# Patient Record
Sex: FEMALE | Race: WHITE | NOT HISPANIC OR LATINO | Employment: FULL TIME | URBAN - METROPOLITAN AREA
[De-identification: names, ages, dates, MRNs, and addresses within clinical notes are randomized per-mention and may not be internally consistent; named-entity substitution may affect disease eponyms.]

---

## 2024-04-02 ENCOUNTER — OFFICE VISIT (OUTPATIENT)
Dept: FAMILY MEDICINE CLINIC | Facility: CLINIC | Age: 29
End: 2024-04-02
Payer: COMMERCIAL

## 2024-04-02 VITALS
TEMPERATURE: 97.7 F | HEIGHT: 64 IN | DIASTOLIC BLOOD PRESSURE: 70 MMHG | OXYGEN SATURATION: 97 % | SYSTOLIC BLOOD PRESSURE: 122 MMHG | BODY MASS INDEX: 34.15 KG/M2 | HEART RATE: 99 BPM | WEIGHT: 200 LBS

## 2024-04-02 DIAGNOSIS — Z13.29 SCREENING FOR ENDOCRINE, METABOLIC AND IMMUNITY DISORDER: ICD-10-CM

## 2024-04-02 DIAGNOSIS — Z13.220 SCREENING FOR LIPID DISORDERS: ICD-10-CM

## 2024-04-02 DIAGNOSIS — Z00.00 WELL ADULT EXAM: Primary | ICD-10-CM

## 2024-04-02 DIAGNOSIS — Z13.0 SCREENING FOR ENDOCRINE, METABOLIC AND IMMUNITY DISORDER: ICD-10-CM

## 2024-04-02 DIAGNOSIS — Z13.228 SCREENING FOR ENDOCRINE, METABOLIC AND IMMUNITY DISORDER: ICD-10-CM

## 2024-04-02 DIAGNOSIS — F31.81 BIPOLAR 2 DISORDER, MAJOR DEPRESSIVE EPISODE (HCC): Chronic | ICD-10-CM

## 2024-04-02 DIAGNOSIS — Z13.1 SCREENING FOR DIABETES MELLITUS (DM): ICD-10-CM

## 2024-04-02 DIAGNOSIS — Z13.0 SCREENING FOR DEFICIENCY ANEMIA: ICD-10-CM

## 2024-04-02 DIAGNOSIS — Z11.59 NEED FOR HEPATITIS C SCREENING TEST: ICD-10-CM

## 2024-04-02 PROBLEM — I45.10 INCOMPLETE RBBB: Status: ACTIVE | Noted: 2021-06-29

## 2024-04-02 PROBLEM — M95.8 OSTEOCHONDRAL DEFECT OF FEMORAL CONDYLE: Status: ACTIVE | Noted: 2021-10-27

## 2024-04-02 PROBLEM — Z72.0 TOBACCO ABUSE: Status: ACTIVE | Noted: 2017-12-07

## 2024-04-02 PROBLEM — S83.004A DISLOCATION OF RIGHT PATELLA: Status: ACTIVE | Noted: 2021-11-09

## 2024-04-02 PROBLEM — F19.91 HISTORY OF DRUG USE: Status: ACTIVE | Noted: 2021-01-29

## 2024-04-02 PROBLEM — J30.2 SEASONAL ALLERGIES: Status: ACTIVE | Noted: 2021-05-17

## 2024-04-02 PROBLEM — R87.610 ASCUS OF CERVIX WITH NEGATIVE HIGH RISK HPV: Status: ACTIVE | Noted: 2018-08-14

## 2024-04-02 PROBLEM — F41.1 GENERALIZED ANXIETY DISORDER: Chronic | Status: ACTIVE | Noted: 2024-04-02

## 2024-04-02 PROCEDURE — 99385 PREV VISIT NEW AGE 18-39: CPT | Performed by: FAMILY MEDICINE

## 2024-04-02 RX ORDER — FLUOXETINE HYDROCHLORIDE 20 MG/1
20 CAPSULE ORAL DAILY
COMMUNITY
Start: 2024-03-15

## 2024-04-02 RX ORDER — SUMATRIPTAN 100 MG/1
100 TABLET, FILM COATED ORAL
COMMUNITY
Start: 2024-03-15

## 2024-04-02 RX ORDER — OLANZAPINE 5 MG/1
7.5 TABLET ORAL
COMMUNITY
Start: 2024-03-15

## 2024-04-02 NOTE — PATIENT INSTRUCTIONS
Schedule ob appointment   Fasting blood work at [facility]     Wellness Visit for Adults   AMBULATORY CARE:   A wellness visit  is when you see your healthcare provider to get screened for health problems. Your healthcare provider will also give you advice on how to stay healthy. Write down your questions so you remember to ask them. Ask your healthcare provider how often you should have a wellness visit.  What happens at a wellness visit:  Your healthcare provider will ask about your health, and your family history of health problems. This includes high blood pressure, heart disease, and cancer. He or she will ask if you have symptoms that concern you, if you smoke, and about your mood. You may also be asked about your intake of medicines, supplements, food, and alcohol. Any of the following may be done:  Your weight  will be checked. Your height may also be checked so your body mass index (BMI) can be calculated. Your BMI shows if you are at a healthy weight.    Your blood pressure  and heart rate will be checked. Your temperature may also be checked.    Blood and urine tests  may be done. Blood tests may be done to check your cholesterol levels. Abnormal cholesterol levels increase your risk for heart disease and stroke. You may also need a blood or urine test to check for diabetes if you are at increased risk. Urine tests may be done to look for signs of an infection or kidney disease.    A physical exam  includes checking your heartbeat and lungs with a stethoscope. Your healthcare provider may also check your skin to look for sun damage.    Screening tests  may be recommended. A screening test is done to check for diseases that may not cause symptoms. The screening tests you may need depend on your age, gender, family history, and lifestyle habits. For example, colorectal screening may be recommended if you are 50 years old or older.    Screening tests you need if you are a woman:   A Pap smear  is used to screen  for cervical cancer. Pap smears are usually done every 3 to 5 years depending on your age. You may need them more often if you have had abnormal Pap smear test results in the past. Ask your healthcare provider how often you should have a Pap smear.    A mammogram  is an x-ray of your breasts to screen for breast cancer. Experts recommend mammograms every 2 years starting at age 50 years. You may need a mammogram at age 49 years or younger if you have an increased risk for breast cancer. Talk to your healthcare provider about when you should start having mammograms and how often you need them.    Vaccines you may need:   Get an influenza vaccine  every year. The influenza vaccine protects you from the flu. Several types of viruses cause the flu. The viruses change over time, so new vaccines are made each year.    Get a tetanus-diphtheria (Td) booster vaccine  every 10 years. This vaccine protects you against tetanus and diphtheria. Tetanus is a severe infection that may cause painful muscle spasms and lockjaw. Diphtheria is a severe bacterial infection that causes a thick covering in the back of your mouth and throat.    Get a human papillomavirus (HPV) vaccine  if you are female and aged 19 to 26 or male 19 to 21 and never received it. This vaccine protects you from HPV infection. HPV is the most common infection spread by sexual contact. HPV may also cause vaginal, penile, and anal cancers.    Get a pneumococcal vaccine  if you are aged 65 years or older. The pneumococcal vaccine is an injection given to protect you from pneumococcal disease. Pneumococcal disease is an infection caused by pneumococcal bacteria. The infection may cause pneumonia, meningitis, or an ear infection.    Get a shingles vaccine  if you are 60 or older, even if you have had shingles before. The shingles vaccine is an injection to protect you from the varicella-zoster virus. This is the same virus that causes chickenpox. Shingles is a  painful rash that develops in people who had chickenpox or have been exposed to the virus.    How to eat healthy:  My Plate is a model for planning healthy meals. It shows the types and amounts of foods that should go on your plate. Fruits and vegetables make up about half of your plate, and grains and protein make up the other half. A serving of dairy is included on the side of your plate. The amount of calories and serving sizes you need depends on your age, gender, weight, and height. Examples of healthy foods are listed below:  Eat a variety of vegetables  such as dark green, red, and orange vegetables. You can also include canned vegetables low in sodium (salt) and frozen vegetables without added butter or sauces.    Eat a variety of fresh fruits , canned fruit in 100% juice, frozen fruit, and dried fruit.    Include whole grains.  At least half of the grains you eat should be whole grains. Examples include whole-wheat bread, wheat pasta, brown rice, and whole-grain cereals such as oatmeal.    Eat a variety of protein foods such as seafood (fish and shellfish), lean meat, and poultry without skin (turkey and chicken). Examples of lean meats include pork leg, shoulder, or tenderloin, and beef round, sirloin, tenderloin, and extra lean ground beef. Other protein foods include eggs and egg substitutes, beans, peas, soy products, nuts, and seeds.    Choose low-fat dairy products such as skim or 1% milk or low-fat yogurt, cheese, and cottage cheese.    Limit unhealthy fats  such as butter, hard margarine, and shortening.       Exercise:  Exercise at least 30 minutes per day on most days of the week. Some examples of exercise include walking, biking, dancing, and swimming. You can also fit in more physical activity by taking the stairs instead of the elevator or parking farther away from stores. Include muscle strengthening activities 2 days each week. Regular exercise provides many health benefits. It helps you  manage your weight, and decreases your risk for type 2 diabetes, heart disease, stroke, and high blood pressure. Exercise can also help improve your mood. Ask your healthcare provider about the best exercise plan for you.       General health and safety guidelines:   Do not smoke.  Nicotine and other chemicals in cigarettes and cigars can cause lung damage. Ask your healthcare provider for information if you currently smoke and need help to quit. E-cigarettes or smokeless tobacco still contain nicotine. Talk to your healthcare provider before you use these products.    Limit alcohol.  A drink of alcohol is 12 ounces of beer, 5 ounces of wine, or 1½ ounces of liquor.    Lose weight, if needed.  Being overweight increases your risk of certain health conditions. These include heart disease, high blood pressure, type 2 diabetes, and certain types of cancer.    Protect your skin.  Do not sunbathe or use tanning beds. Use sunscreen with a SPF 15 or higher. Apply sunscreen at least 15 minutes before you go outside. Reapply sunscreen every 2 hours. Wear protective clothing, hats, and sunglasses when you are outside.    Drive safely.  Always wear your seatbelt. Make sure everyone in your car wears a seatbelt. A seatbelt can save your life if you are in an accident. Do not use your cell phone when you are driving. This could distract you and cause an accident. Pull over if you need to make a call or send a text message.    Practice safe sex.  Use latex condoms if are sexually active and have more than one partner. Your healthcare provider may recommend screening tests for sexually transmitted infections (STIs).    Wear helmets, lifejackets, and protective gear.  Always wear a helmet when you ride a bike or motorcycle, go skiing, or play sports that could cause a head injury. Wear protective equipment when you play sports. Wear a lifejacket when you are on a boat or doing water sports.    © Copyright [employer] 2023 Information  is for End User's use only and may not be sold, redistributed or otherwise used for commercial purposes.  The above information is an  only. It is not intended as medical advice for individual conditions or treatments. Talk to your doctor, nurse or pharmacist before following any medical regimen to see if it is safe and effective for you.

## 2024-04-03 ENCOUNTER — TELEPHONE (OUTPATIENT)
Dept: ADMINISTRATIVE | Facility: OTHER | Age: 29
End: 2024-04-03

## 2024-04-03 NOTE — TELEPHONE ENCOUNTER
----- Message from Agnes Muro MA sent at 4/2/2024  3:42 PM EDT -----  Regarding: Pap  Pap results in patient chart from 05/19/2023, please update record. Thank you

## 2024-04-03 NOTE — TELEPHONE ENCOUNTER
Upon review of the In Basket request we were able to locate, review, and update the patient chart as requested for Pap Smear (HPV) aka Cervical Cancer Screening.    Any additional questions or concerns should be emailed to the Practice Liaisons via the appropriate education email address, please do not reply via In Basket.    Thank you  Caryl Eller

## 2024-04-06 PROBLEM — Z00.00 WELL ADULT EXAM: Status: ACTIVE | Noted: 2024-04-06

## 2024-04-07 NOTE — PROGRESS NOTES
ADULT ANNUAL PHYSICAL  [facility]    NAME: Kimberlyn Wills  AGE: 28 y.o. SEX: female  : 1995     DATE: 2024     Assessment and Plan:     1. Well adult exam  Assessment & Plan:  Pt will get her blood work done with her OB blood work.       2. Bipolar 2 disorder, major depressive episode (HCC)  Assessment & Plan:  Continue with psychiatry fluoxetine and olanzapine- stable on medication.       3. Screening for deficiency anemia  -     CBC and differential    4. Screening for diabetes mellitus (DM)  -     Comprehensive metabolic panel    5. Screening for endocrine, metabolic and immunity disorder  -     Comprehensive metabolic panel  -     TSH, 3rd generation with Free T4 reflex    6. Screening for lipid disorders  -     Lipid Panel with Direct LDL reflex    7. Need for hepatitis C screening test  -     Hepatitis C antibody; Future  -     Hepatitis C antibody        Immunizations and preventive care screenings were discussed with patient today. Appropriate education was printed on patient's after visit summary.    Counseling:  Dental Health: discussed importance of regular tooth brushing, flossing, and dental visits.  Exercise: the importance of regular exercise/physical activity was discussed. Recommend exercise 3-5 times per week for at least 30 minutes.          No follow-ups on file.     Chief Complaint:     Chief Complaint   Patient presents with    Establish Care     NP/ PCP was tejal alberto   She found out she pregnant    Pend labs       History of Present Illness:     Pt is here for her initial visit in our office. She just found out yesterday- she had a positive pregnancy test. She is generally healthy. She has had another pregnancy and was able to quit smoking during the pregnancy. She will make an appointment with her OB. She states she feels pretty good.           Review of Systems:     Review of Systems   Constitutional: Negative.  Negative  for fatigue and fever.   HENT: Negative.     Eyes: Negative.    Respiratory: Negative.  Negative for cough.    Cardiovascular: Negative.    Gastrointestinal: Negative.    Endocrine: Negative.    Genitourinary: Negative.    Musculoskeletal: Negative.    Skin: Negative.    Allergic/Immunologic: Negative.    Neurological: Negative.    Psychiatric/Behavioral: Negative.        Past Medical History:     Past Medical History:   Diagnosis Date    Asthma     Psychiatric disorder     depression and anxiety      Past Surgical History:     Past Surgical History:   Procedure Laterality Date    WISDOM TOOTH EXTRACTION        Social History:     Social History     Socioeconomic History    Marital status: Single     Spouse name: None    Number of children: None    Years of education: None    Highest education level: None   Occupational History    None   Tobacco Use    Smoking status: Every Day    Smokeless tobacco: None   Substance and Sexual Activity    Alcohol use: Yes     Comment: occassionally    Drug use: No    Sexual activity: None   Other Topics Concern    None   Social History Narrative    None     Social Determinants of Health     Financial Resource Strain: Not on file   Food Insecurity: Not on file   Transportation Needs: Not on file   Physical Activity: Not on file   Stress: Not on file   Social Connections: Not on file   Intimate Partner Violence: Not on file   Housing Stability: Not on file      Family History:     History reviewed. No pertinent family history.   Current Medications:     Current Outpatient Medications   Medication Sig Dispense Refill    FLUoxetine (PROzac) 20 mg capsule Take 20 mg by mouth daily      OLANZapine (ZyPREXA) 5 mg tablet Take 7.5 mg by mouth      SUMAtriptan (IMITREX) 100 mg tablet Take 100 mg by mouth       No current facility-administered medications for this visit.      Allergies:     No Known Allergies   Physical Exam:     /70   Pulse 99   Temp 97.7 °F (36.5 °C) (Tympanic)   Ht  "5' 4\" (1.626 m)   Wt 90.7 kg (200 lb)   SpO2 97%   BMI 34.33 kg/m²     Physical Exam  Vitals and nursing note reviewed.   Constitutional:       Appearance: She is well-developed.   HENT:      Head: Normocephalic and atraumatic.      Right Ear: External ear normal.      Left Ear: External ear normal.      Nose: Nose normal.   Eyes:      Conjunctiva/sclera: Conjunctivae normal.      Pupils: Pupils are equal, round, and reactive to light.   Cardiovascular:      Rate and Rhythm: Normal rate and regular rhythm.      Heart sounds: Normal heart sounds.   Pulmonary:      Effort: Pulmonary effort is normal.      Breath sounds: Normal breath sounds.   Abdominal:      General: Bowel sounds are normal.      Palpations: Abdomen is soft.   Musculoskeletal:         General: Normal range of motion.      Cervical back: Normal range of motion and neck supple.   Skin:     General: Skin is warm and dry.   Neurological:      Mental Status: She is alert and oriented to person, place, and time.   Psychiatric:         Behavior: Behavior normal.         Thought Content: Thought content normal.         Judgment: Judgment normal.          Millie Jacob DO  [facility]  "

## 2024-04-15 NOTE — PROGRESS NOTES
"S: 28 y.o.  who presents for viability scan with LMP of 24 (exact with regular periods). She is 8 weeks and 3 days by her LMP. She reports significant depression; increased medications of Zyprexa and Prozac. She plans to continue medications and work with counselors. She denies cramping or vaginal bleeding. This is a surprise but welcomed pregnancy. Her previous pregnancies complicated by partial placenta abruption.     Past Medical History:   Diagnosis Date    Asthma     Psychiatric disorder     depression and anxiety       OB History    Para Term  AB Living   2 1 1     1   SAB IAB Ectopic Multiple Live Births           1      # Outcome Date GA Lbr Felipe/2nd Weight Sex Delivery Anes PTL Lv   2 Current            1 Term 18 38w4d / 00:09 2356 g (5 lb 3.1 oz) F Vag-Spont EPI, Local  KIT      Complications: Abruptio Placenta        O:  Vitals:    24 1022   BP: 124/68   BP Location: Left arm   Patient Position: Sitting   Cuff Size: Large   Weight: 90.3 kg (199 lb)   Height: 5' 4\" (1.626 m)     TVUS indicated viable IUP, measuring 14.1 mm, correlating to CRL of 7w5d. CRL is concurrent with LMP at 8w3d. WARREN is 24 based on LMP. FHR: 158    A/P:  1. Viable pregnancy on TVUS  2. MFM referral placed.  3. RTC in 2 week for nurse intake visit    Problem List Items Addressed This Visit    None  Visit Diagnoses       Amenorrhea    -  Primary    Relevant Orders    [facility] US OB < 14 weeks single or first gestation level 1    Early stage of pregnancy        Relevant Orders    Ambulatory Referral to Maternal Fetal Medicine           "

## 2024-04-18 ENCOUNTER — ULTRASOUND (OUTPATIENT)
Dept: OBGYN CLINIC | Facility: CLINIC | Age: 29
End: 2024-04-18

## 2024-04-18 VITALS
WEIGHT: 199 LBS | HEIGHT: 64 IN | DIASTOLIC BLOOD PRESSURE: 68 MMHG | SYSTOLIC BLOOD PRESSURE: 124 MMHG | BODY MASS INDEX: 33.97 KG/M2

## 2024-04-18 DIAGNOSIS — Z34.90 EARLY STAGE OF PREGNANCY: ICD-10-CM

## 2024-04-18 DIAGNOSIS — N91.2 AMENORRHEA: Primary | ICD-10-CM

## 2024-04-18 NOTE — PATIENT INSTRUCTIONS
At Checkout, make an appointment for OB Nurse Intake and Education in 2 weeks.  Please schedule future prenatal visits at checkout or by calling the [place] office at [phone]. Next appointment with a provider is in 4 weeks and will be a physical exam.   Call Maternal fetal medicine to establish care.    [email or web address]

## 2024-04-25 ENCOUNTER — INITIAL PRENATAL (OUTPATIENT)
Dept: OBGYN CLINIC | Facility: CLINIC | Age: 29
End: 2024-04-25

## 2024-04-25 VITALS
DIASTOLIC BLOOD PRESSURE: 64 MMHG | OXYGEN SATURATION: 98 % | RESPIRATION RATE: 16 BRPM | WEIGHT: 199.8 LBS | BODY MASS INDEX: 34.11 KG/M2 | HEIGHT: 64 IN | SYSTOLIC BLOOD PRESSURE: 126 MMHG | HEART RATE: 84 BPM

## 2024-04-25 DIAGNOSIS — Z34.81 ENCOUNTER FOR SUPERVISION OF OTHER NORMAL PREGNANCY, FIRST TRIMESTER: Primary | ICD-10-CM

## 2024-04-25 DIAGNOSIS — Z31.430 ENCOUNTER OF FEMALE FOR TESTING FOR GENETIC DISEASE CARRIER STATUS FOR PROCREATIVE MANAGEMENT: ICD-10-CM

## 2024-04-25 PROCEDURE — OBC

## 2024-04-25 NOTE — PROGRESS NOTES
Details that I feel the provider should be aware of:   -h/o  2018 complicated by partial placenta abruption  -EPDS 13. H/o PPD. Medical history significant for depression, recently had medications increased by prescribing provider. Reports established and follows closely, provider had now recommended/planning for monthly follow ups during pregnancy.  -current tobacco and marijuana use. Patient reports weaning down to 3 cig/day. Has medical marijuana card had stopped use then noticed increased symptoms so has been using sporadically as needed. Patient reports reviewing with provider at d/v us and states will review with MFM at upcoming scheduled appt  24.  -last pap 23      OB INTAKE INTERVIEW  Patient is 28 y.o.y.o. who presents for OB intake at 9w3d  She is present alone during this encounter  The father of her baby (Frederic) is fiance, supportive and involved in the pregnancy and is 30 years old.      Last Menstrual Period: 24  Ultrasound: Measured 7 weeks 5 days on 24  Estimated Date of Delivery: 24 by lmp confirmed by 7 week US    Signs/Symptoms of Pregnancy  Current pregnancy symptoms: +nausea - taking vitamin b6 and managing. Reviewed recommendations. Denies vomiting.  Constipation YES - reviewed  Headaches YES - h/o prior, imitrex prescription prn. Has not taken in pregnancy. 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 Tylenol is effective. Reviewed recommendations and when to call office.   Cramping/spotting YES - intermittent cramping, quickly resolving when occurs. Denies any VB. Aware if persistent, worsening, or other symptoms to call office.  PICA cravings no    Diabetes-  BMI 34.16  If patient has 1 or more, please order early 1 hour GTT  History of GDM no  BMI >35 no  History of PCOS or current metformin use no  History of LGA/macrosomic infant (4000g/9lbs) no    If patient has 2 or more, please order early 1 hour GTT  BMI>30 YES  AMA no  First  degree relative with type 2 diabetes no  History of chronic HTN, hyperlipidemia, elevated A1C no  High risk race (, , ,  or ) no    Hypertension- if you answer yes to any of the following, please order baseline preeclampsia labs (cbc, comprehensive metabolic panel, urine protein creatinine ratio, uric acid)  History of of chronic HTN no  History of gestational HTN no  History of preeclampsia, eclampsia, or HELLP syndrome no  History of diabetes no  History of lupus, autoimmune disease, kidney disease no    Thyroid- if yes order TSH with reflex T4  History of thyroid disease no    Bleeding Disorder or Hx of DVT-patient or first degree relative with history of. Order the following if not done previously.   (Factor V, antithrombin III, prothrombin gene mutation, protein C and S Ag, lupus anticoagulant, anticardiolipin, beta-2 glycoprotein)   no    OB/GYN-  History of abnormal pap smear YES     - pt recalls 1 prior abnormal pap, unsure of details, re-pap recommended and f/u pap negative/normal  Date of last pap smear 23  History of HPV no  History of Herpes/HSV no  History of other STI (gonorrhea, chlamydia, trich) no  History of prior  YES  History of prior  no  History of  delivery prior to 36 weeks 6 days no  History of blood transfusion no  Ok for blood transfusion yes    Substance screening-   History of tobacco use YES  Currently using tobacco YES - weaning. Currently down to 3/day  Substance Use Screen Level (N/A, LOW, HIGH) low risk    MRSA Screening-   Does the pt have a hx of MRSA? no    Immunizations:  Influenza vaccine given this season n/a - reviewed  Discussed Tdap vaccine yes  Discussed COVID Vaccine yes    Genetic/MFM-  Do you or your partner have a history of any of the following in yourselves or first degree relatives?  Cystic fibrosis no  Spinal muscular atrophy no  Hemoglobinopathy/Sickle Cell/Thalassemia no  Fragile X  Intellectual Disability no    Confirmed with patient no prior genetic carrier screenings completed. Reviewed, ordered and pt aware of prior auth/scheduling process.      Appointment for Nuchal Translucency Ultrasound at [facility] scheduled for 5/17/24      Interview education  [facility] Pregnancy Essentials Book reviewed, discussed and attached to their AVS yes    Prenatal lab work scripts yes    Aspirin/Preeclampsia Screen    Risk Level Risk Factor Recommendation   LOW Prior Uncomplicated full-term delivery YES No Aspirin recommendation        MODERATE Nulliparity no Recommend low-dose aspirin if     BMI>30 YES 2 or more moderate risk factors    Family History Preeclampsia (mother/sister) no     35yr old or greater no      or Low Socioeconomic no     IVF Pregnancy  no     Personal History Risks (low birth weight, prior adverse preg outcome, >10yr preg interval) no         HIGH History of Preeclampsia no Recommend low-dose aspirin if     Multifetal gestation no 1 or more high risk factors    Chronic HTN no     Type 1 or 2 Diabetes no     Renal Disease no     Autoimmune Disease  no      Contraindications to ASA therapy:  NSAID/ ASA allergy: no  Nasal polyps: no  Asthma with history of ASA induced bronchospasm: no  Relative contraindications:  History of GI bleed: no  Active peptic ulcer disease: no  Severe hepatic dysfunction: no    Patient should be recommended to take ASA 162mg during this pregnancy from 12-36wks to lower her risk of preeclampsia: reviewed      The patient has a history now or in prior pregnancy notable for:  tobacco use        PN1 visit scheduled. The patient was oriented to our practice, the navigator role, reviewed delivering physicians and [facility] for Delivery. All questions were answered.    Interviewed by: KITTY Metz

## 2024-04-25 NOTE — PATIENT INSTRUCTIONS
Congratulations!! Please review our Pregnancy Essential Guide and [facility] L&D Virtual tour from our networks website.     [facility] Pregnancy Essentials Guide  [facility] ([email or web address])     [facility] - Virtual [facility] ([email or web address])

## 2024-05-01 ENCOUNTER — TELEPHONE (OUTPATIENT)
Dept: OBGYN CLINIC | Facility: CLINIC | Age: 29
End: 2024-05-01

## 2024-05-01 NOTE — TELEPHONE ENCOUNTER
Lmom to rs appt 5/16 due to provider not in office. Please forward to [place] office if she calls back

## 2024-05-06 PROBLEM — Z00.00 WELL ADULT EXAM: Status: RESOLVED | Noted: 2024-04-06 | Resolved: 2024-05-06

## 2024-05-17 ENCOUNTER — ROUTINE PRENATAL (OUTPATIENT)
Facility: HOSPITAL | Age: 29
End: 2024-05-17
Payer: COMMERCIAL

## 2024-05-17 ENCOUNTER — APPOINTMENT (OUTPATIENT)
Dept: LAB | Facility: CLINIC | Age: 29
End: 2024-05-17
Payer: COMMERCIAL

## 2024-05-17 VITALS
DIASTOLIC BLOOD PRESSURE: 68 MMHG | SYSTOLIC BLOOD PRESSURE: 122 MMHG | HEIGHT: 64 IN | HEART RATE: 88 BPM | WEIGHT: 197.8 LBS | BODY MASS INDEX: 33.77 KG/M2

## 2024-05-17 DIAGNOSIS — Z36.82 NUCHAL TRANSLUCENCY OF FETUS ON PRENATAL ULTRASOUND: ICD-10-CM

## 2024-05-17 DIAGNOSIS — O09.291 HISTORY OF IUGR (INTRAUTERINE GROWTH RETARDATION) AND STILLBIRTH, CURRENTLY PREGNANT, FIRST TRIMESTER: ICD-10-CM

## 2024-05-17 DIAGNOSIS — O35.EXX0 FETAL BLADDER OUTLET OBSTRUCTION AFFECTING ANTEPARTUM CARE OF MOTHER, SINGLE OR UNSPECIFIED FETUS: Primary | ICD-10-CM

## 2024-05-17 DIAGNOSIS — Z3A.12 12 WEEKS GESTATION OF PREGNANCY: ICD-10-CM

## 2024-05-17 DIAGNOSIS — Z13.1 SCREENING FOR DIABETES MELLITUS (DM): ICD-10-CM

## 2024-05-17 DIAGNOSIS — Z13.228 SCREENING FOR ENDOCRINE, METABOLIC AND IMMUNITY DISORDER: ICD-10-CM

## 2024-05-17 DIAGNOSIS — Z31.430 ENCOUNTER OF FEMALE FOR TESTING FOR GENETIC DISEASE CARRIER STATUS FOR PROCREATIVE MANAGEMENT: ICD-10-CM

## 2024-05-17 DIAGNOSIS — Z13.0 SCREENING FOR DEFICIENCY ANEMIA: ICD-10-CM

## 2024-05-17 DIAGNOSIS — Z13.29 SCREENING FOR ENDOCRINE, METABOLIC AND IMMUNITY DISORDER: ICD-10-CM

## 2024-05-17 DIAGNOSIS — Z79.899 MEDICAL MARIJUANA USE: ICD-10-CM

## 2024-05-17 DIAGNOSIS — Z34.81 ENCOUNTER FOR SUPERVISION OF OTHER NORMAL PREGNANCY, FIRST TRIMESTER: ICD-10-CM

## 2024-05-17 DIAGNOSIS — Z11.59 NEED FOR HEPATITIS C SCREENING TEST: Primary | ICD-10-CM

## 2024-05-17 DIAGNOSIS — Z13.0 SCREENING FOR ENDOCRINE, METABOLIC AND IMMUNITY DISORDER: ICD-10-CM

## 2024-05-17 DIAGNOSIS — O28.3 ABNORMAL PRENATAL ULTRASOUND: ICD-10-CM

## 2024-05-17 DIAGNOSIS — Z13.220 SCREENING FOR LIPID DISORDERS: ICD-10-CM

## 2024-05-17 PROBLEM — Z87.59 HISTORY OF PLACENTA ABRUPTION: Status: ACTIVE | Noted: 2024-05-17

## 2024-05-17 LAB
ABO GROUP BLD: NORMAL
BACTERIA UR QL AUTO: ABNORMAL /HPF
BASOPHILS # BLD AUTO: 0.02 THOUSANDS/ÂΜL (ref 0–0.1)
BASOPHILS NFR BLD AUTO: 0 % (ref 0–1)
BILIRUB UR QL STRIP: NEGATIVE
BLD GP AB SCN SERPL QL: NEGATIVE
CLARITY UR: CLEAR
COLOR UR: COLORLESS
EOSINOPHIL # BLD AUTO: 0.05 THOUSAND/ÂΜL (ref 0–0.61)
EOSINOPHIL NFR BLD AUTO: 1 % (ref 0–6)
ERYTHROCYTE [DISTWIDTH] IN BLOOD BY AUTOMATED COUNT: 13.4 % (ref 11.6–15.1)
GLUCOSE UR STRIP-MCNC: NEGATIVE MG/DL
HCT VFR BLD AUTO: 34.8 % (ref 34.8–46.1)
HGB BLD-MCNC: 11.6 G/DL (ref 11.5–15.4)
HGB UR QL STRIP.AUTO: NEGATIVE
IMM GRANULOCYTES # BLD AUTO: 0.01 THOUSAND/UL (ref 0–0.2)
IMM GRANULOCYTES NFR BLD AUTO: 0 % (ref 0–2)
KETONES UR STRIP-MCNC: NEGATIVE MG/DL
LEUKOCYTE ESTERASE UR QL STRIP: ABNORMAL
LYMPHOCYTES # BLD AUTO: 3.02 THOUSANDS/ÂΜL (ref 0.6–4.47)
LYMPHOCYTES NFR BLD AUTO: 54 % (ref 14–44)
MCH RBC QN AUTO: 30.4 PG (ref 26.8–34.3)
MCHC RBC AUTO-ENTMCNC: 33.3 G/DL (ref 31.4–37.4)
MCV RBC AUTO: 91 FL (ref 82–98)
MONOCYTES # BLD AUTO: 0.3 THOUSAND/ÂΜL (ref 0.17–1.22)
MONOCYTES NFR BLD AUTO: 5 % (ref 4–12)
NEUTROPHILS # BLD AUTO: 2.22 THOUSANDS/ÂΜL (ref 1.85–7.62)
NEUTS SEG NFR BLD AUTO: 40 % (ref 43–75)
NITRITE UR QL STRIP: NEGATIVE
NON-SQ EPI CELLS URNS QL MICRO: ABNORMAL /HPF
NRBC BLD AUTO-RTO: 0 /100 WBCS
PH UR STRIP.AUTO: 7.5 [PH]
PLATELET # BLD AUTO: 231 THOUSANDS/UL (ref 149–390)
PMV BLD AUTO: 10 FL (ref 8.9–12.7)
PROT UR STRIP-MCNC: NEGATIVE MG/DL
RBC # BLD AUTO: 3.81 MILLION/UL (ref 3.81–5.12)
RBC #/AREA URNS AUTO: ABNORMAL /HPF
RH BLD: POSITIVE
RUBV IGG SERPL IA-ACNC: 135.3 IU/ML
SP GR UR STRIP.AUTO: 1.01 (ref 1–1.03)
SPECIMEN EXPIRATION DATE: NORMAL
UROBILINOGEN UR STRIP-ACNC: <2 MG/DL
WBC # BLD AUTO: 5.62 THOUSAND/UL (ref 4.31–10.16)
WBC #/AREA URNS AUTO: ABNORMAL /HPF

## 2024-05-17 PROCEDURE — 87389 HIV-1 AG W/HIV-1&-2 AB AG IA: CPT

## 2024-05-17 PROCEDURE — 76813 OB US NUCHAL MEAS 1 GEST: CPT | Performed by: OBSTETRICS & GYNECOLOGY

## 2024-05-17 PROCEDURE — 81001 URINALYSIS AUTO W/SCOPE: CPT

## 2024-05-17 PROCEDURE — 86803 HEPATITIS C AB TEST: CPT

## 2024-05-17 PROCEDURE — 83020 HEMOGLOBIN ELECTROPHORESIS: CPT

## 2024-05-17 PROCEDURE — 86901 BLOOD TYPING SEROLOGIC RH(D): CPT

## 2024-05-17 PROCEDURE — 86762 RUBELLA ANTIBODY: CPT

## 2024-05-17 PROCEDURE — 76801 OB US < 14 WKS SINGLE FETUS: CPT | Performed by: OBSTETRICS & GYNECOLOGY

## 2024-05-17 PROCEDURE — 87340 HEPATITIS B SURFACE AG IA: CPT

## 2024-05-17 PROCEDURE — 86780 TREPONEMA PALLIDUM: CPT

## 2024-05-17 PROCEDURE — 36415 COLL VENOUS BLD VENIPUNCTURE: CPT

## 2024-05-17 PROCEDURE — 81329 SMN1 GENE DOS/DELETION ALYS: CPT

## 2024-05-17 PROCEDURE — 85025 COMPLETE CBC W/AUTO DIFF WBC: CPT

## 2024-05-17 PROCEDURE — 86900 BLOOD TYPING SEROLOGIC ABO: CPT

## 2024-05-17 PROCEDURE — 99244 OFF/OP CNSLTJ NEW/EST MOD 40: CPT | Performed by: OBSTETRICS & GYNECOLOGY

## 2024-05-17 PROCEDURE — 86850 RBC ANTIBODY SCREEN: CPT

## 2024-05-17 PROCEDURE — 87086 URINE CULTURE/COLONY COUNT: CPT

## 2024-05-17 NOTE — LETTER
May 18, 2024     JR Reaves  [address] [place] [address]    Patient: Kimberlyn Wills   YOB: 1995   Date of Visit: 2024       Dear Dr. Veras:    Thank you for referring Kimberlyn Wills to me for evaluation. Below are my notes for this consultation.    If you have questions, please do not hesitate to call me. I look forward to following your patient along with you.         Sincerely,        Geovanna Bolton MD        CC: No Recipients    Geovanna Bolton MD  2024 12:43 AM  Sign when Signing Visit  OFFICE CONSULT  Referring physician:   Jr Reaves  [address] [address]      Dear Luana Veras      Thank you for requesting a  consultation on your patient Ms. Kimberlyn Wills for the following indications:  Genetic screening    History  Medications: Prenatal vitamins, Zyprexa 0.5 mg daily, Prozac 20 mg daily  Allergies to medications: None  Past medical history: Anxiety and bipolar 2 disorder, history of an opiate use disorder in a prior pregnancy but not currently, she reports the use of medical marijuana  Past surgical history: Dental implants, right knee surgery, wisdom tooth extraction  Past obstetrical history:   2018 she had a 5 pound 3.1 ounce baby girl delivered vaginally at 38 weeks and 4 days.  Her pregnancy was complicated by a thickened nuchal translucency, marginal placental cord insertion and fetal IUGR with normal Dopplers.  She initially reported possible abruption in that pregnancy but I reviewed her records and I think she misinterpreted the marginal placental cord insertion as she states it was found on an ultrasound and she was started on fetal testing for this indication.  The ultrasounds I reviewed did not report any evidence of an abruption but did report a marginal placental cord insertion and fetal testing was actually started because of the fetal growth restriction found.   Social history: Medical  marijuana  First generation family history: Contributory    Ultrasound findings:The ultrasound shows a fetus concordant with dates. The nasal bone and nuchal translucency appears normal.  A dstended fetal bladder is seen today.  No malformations are seen on today's early ultrasound.     The patient was informed of the findings and counseled about the limitations of the exam in detecting all forms of fetal congenital abnormalities.    She does not report any vaginal bleeding or uterine cramping or contractions.      Specific counseling was provided on the following problems:  We discussed the options for genetic screening which include invasive testing on the fetal placenta or on fetal skin cells within the amniotic fluid and compared this to noninvasive testing which includes cell free DNA screening.  We reviewed the risks, the benefits and the limitations of each.  In the end patient chose to complete the cell free DNA screen.     An enlarged/distended fetal bladder can be associated with a diagnosis of possible bladder outlet obstruction Recommend a follow-up ultrasound in 2 weeks to show if there is any further progression to confirm this diagnosis.  If bladder outlet obstruction is confirmed then recommend referral to [facility] to discuss the option of placement of a shunt to allow the fetal urine to escape from the fetal bladder and lessen any renal complications that could develop from a back up of urine in the kidneys.  Pregnant women with a BMI>30 ideally should aim for maximum weight gain of 11-20 pounds ideally via healthy diet and regular exercise. Maternal BMI above 30 in pregnancy confers increased risks of preeclampsia, GDM, cardiac dysfunction, sleep apnea,  delivery, and VTE, and fetal risks include miscarriage, fetal anomalies (along with reduced detection), and stillbirth, macrosomia and impaired growth.   Her pregnancy  was complicated with IUGR and a marginal placental cord insertion.  Recommend a 28 and 34-week ultrasound for growth  We reviewed Prozac, Zyprexa and marijuana use in pregnancy found in the mother to baby website.   Fluoxetine (Prozac®) - MotherToBaby Olanzapine (Zyprexa®) - MotherToBaby Marijuana (Cannabis) - MotherToBaby            Future tests recommended:  The results of her NIPT will return in 7-10 days.  Screening for spina bifida with an MSAFP screen is a future test that can be prescribed through her OB office.  This blood work should be drawn preferably at 16 to 18 weeks so that the results return prior to her next scan.  The test though can be run until 21 weeks and 6 days if needed.    Future ultrasounds ordered today:   I scheduled her for a follow-up ultrasound in 2 weeks to show if there is any further progression in this enlarged fetal bladder to suggest that the fetus does indeed have a bladder outlet obstruction.     Pre visit time reviewing her records   10 minutes  Face to face time 20minutes  Post visit time on documentation of note, updating her problem list, adding orders and prescriptions 20 minutes.  Procedures that were completed today were charged separately.   The level of decision making was moderate complexity.    Geovanna Bolton MD

## 2024-05-17 NOTE — PROGRESS NOTES
Per [employer] insurance customer service(availity) -no prior authorization is required for NIPT 33185.    Phone call to patient and notified insurance plan does not require authorization for NIPT.    Encouraged patient to determine if she met her lab deductible requirement with insurance plan.    For complete NIPT billing information patient can visit  [email or web address]  or call [employer]  @ [phone]

## 2024-05-17 NOTE — PROGRESS NOTES
OFFICE CONSULT  Referring physician:   Luana Veras, Sondra  [address] [place] [address]      Dear Luana Veras      Thank you for requesting a  consultation on your patient Ms. Kimberlyn Wills for the following indications:  Genetic screening    History  Medications: Prenatal vitamins, Zyprexa 0.5 mg daily, Prozac 20 mg daily  Allergies to medications: None  Past medical history: Anxiety and bipolar 2 disorder, history of an opiate use disorder in a prior pregnancy but not currently, she reports the use of medical marijuana  Past surgical history: Dental implants, right knee surgery, wisdom tooth extraction  Past obstetrical history:   2018 she had a 5 pound 3.1 ounce baby girl delivered vaginally at 38 weeks and 4 days.  Her pregnancy was complicated by a thickened nuchal translucency, marginal placental cord insertion and fetal IUGR with normal Dopplers.  She initially reported possible abruption in that pregnancy but I reviewed her records and I think she misinterpreted the marginal placental cord insertion as she states it was found on an ultrasound and she was started on fetal testing for this indication.  The ultrasounds I reviewed did not report any evidence of an abruption but did report a marginal placental cord insertion and fetal testing was actually started because of the fetal growth restriction found.   Social history: Medical marijuana  First generation family history: Contributory    Ultrasound findings:The ultrasound shows a fetus concordant with dates. The nasal bone and nuchal translucency appears normal.  A dstended fetal bladder is seen today.  No malformations are seen on today's early ultrasound.     The patient was informed of the findings and counseled about the limitations of the exam in detecting all forms of fetal congenital abnormalities.    She does not report any vaginal bleeding or uterine cramping or contractions.      Specific counseling was provided on the  following problems:  We discussed the options for genetic screening which include invasive testing on the fetal placenta or on fetal skin cells within the amniotic fluid and compared this to noninvasive testing which includes cell free DNA screening.  We reviewed the risks, the benefits and the limitations of each.  In the end patient chose to complete the cell free DNA screen.     An enlarged/distended fetal bladder can be associated with a diagnosis of possible bladder outlet obstruction Recommend a follow-up ultrasound in 2 weeks to show if there is any further progression to confirm this diagnosis.  If bladder outlet obstruction is confirmed then recommend referral to [facility] to discuss the option of placement of a shunt to allow the fetal urine to escape from the fetal bladder and lessen any renal complications that could develop from a back up of urine in the kidneys.  Pregnant women with a BMI>30 ideally should aim for maximum weight gain of 11-20 pounds ideally via healthy diet and regular exercise. Maternal BMI above 30 in pregnancy confers increased risks of preeclampsia, GDM, cardiac dysfunction, sleep apnea,  delivery, and VTE, and fetal risks include miscarriage, fetal anomalies (along with reduced detection), and stillbirth, macrosomia and impaired growth.   Her pregnancy  was complicated with IUGR and a marginal placental cord insertion. Recommend a 28 and 34-week ultrasound for growth  We reviewed Prozac, Zyprexa and marijuana use in pregnancy found in the mother to baby website.   Fluoxetine (Prozac®) - MotherToBaby Olanzapine (Zyprexa®) - MotherToBaby Marijuana (Cannabis) - MotherToBaby            Future tests recommended:  The results of her NIPT will return in 7-10 days.  Screening for spina bifida with an MSAFP screen is a future test that can be prescribed through her OB office.  This blood work should be drawn preferably at 16 to 18 weeks so that the results return prior to her next scan.   The test though can be run until 21 weeks and 6 days if needed.    Future ultrasounds ordered today:   I scheduled her for a follow-up ultrasound in 2 weeks to show if there is any further progression in this enlarged fetal bladder to suggest that the fetus does indeed have a bladder outlet obstruction.     Pre visit time reviewing her records   10 minutes  Face to face time 20minutes  Post visit time on documentation of note, updating her problem list, adding orders and prescriptions 20 minutes.  Procedures that were completed today were charged separately.   The level of decision making was moderate complexity.    Geovanna Bolton MD

## 2024-05-17 NOTE — PROGRESS NOTES
Patient chose to have [facility] [id number] Non-Invasive Prenatal Screen [id number] PLUS w/ SCA, WITH fetal sex.  Patient choose to be billed through insurance.     Patient given brochure and is aware [facility] will contact patient's insurance and coordinate coverage.  Provided [facility] contact information. General inquiries [phone], Cost estimates [phone] and [facility] Billing [phone]. Website [email or web address]     Blood collection tubes labeled with patient identifiers (name, medical record number, and date of birth).     Filled out [facility] order form. Patient chose to be sent to an outpatient lab to complete blood work.       If patient chose to have blood work drawn at a [facility] lab we requested patient notify MFM (via phone call or [employer] message) when blood collected so office can follow up on results.       Maternal Fetal Medicine will have results in approximately 5-7 business days and will call patient or notify via [employer].  Patient aware viewing lab result online will reveal fetal sex if ordered.    Patient verbalized understanding of all instructions and no questions at this time.

## 2024-05-18 PROBLEM — O09.291 HISTORY OF IUGR (INTRAUTERINE GROWTH RETARDATION) AND STILLBIRTH, CURRENTLY PREGNANT, FIRST TRIMESTER: Status: ACTIVE | Noted: 2024-05-18

## 2024-05-18 LAB
BACTERIA UR CULT: NORMAL
HBV SURFACE AG SER QL: NORMAL
HCV AB SER QL: NORMAL
HIV 1+2 AB+HIV1 P24 AG SERPL QL IA: NORMAL
HIV 2 AB SERPL QL IA: NORMAL
HIV1 AB SERPL QL IA: NORMAL
HIV1 P24 AG SERPL QL IA: NORMAL
MISCELLANEOUS LAB TEST RESULT: NORMAL
T PALLIDUM AB SER QL IF: NON REACTIVE

## 2024-05-22 LAB
CFDNA.FET/CFDNA.TOTAL SFR FETUS: NORMAL %
CITATION REF LAB TEST: NORMAL
CITATION REF LAB TEST: NORMAL
CLINICAL INFO: NORMAL
ETHNIC BACKGROUND STATED: NORMAL
FET 13+18+21+X+Y ANEUP PLAS.CFDNA: NEGATIVE
FET CHR 21 TS PLAS.CFDNA QL: NEGATIVE
FET CHR 21 TS PLAS.CFDNA QL: NEGATIVE
FET MS X RISK WBC.DNA+CFDNA QL: NOT DETECTED
FET SEX PLAS.CFDNA DOSAGE CFDNA: NORMAL
FET TS 13 RISK PLAS.CFDNA QL: NEGATIVE
FET X + Y ANEUP RISK PLAS.CFDNA SEQ-IMP: NOT DETECTED
GA EST FROM CONCEPTION DATE: NORMAL D
GENE DIS ANL CARRIER INTERP-IMP: NORMAL
GENE MUT TESTED BLD/T: NORMAL
GESTATIONAL AGE > 9:: YES
LAB DIRECTOR NAME PROVIDER: NORMAL
LABORATORY COMMENT REPORT: NORMAL
LIMITATIONS OF THE TEST: NORMAL
NEGATIVE PREDICTIVE VALUE: NORMAL
PERFORMANCE CHARACTERISTICS: NORMAL
POSITIVE PREDICTIVE VALUE: NORMAL
REASON FOR REFERRAL (NARRATIVE): NORMAL
RECOMMENDATION PATIENT DOC-IMP: NORMAL
REF LAB TEST METHOD: NORMAL
REF LAB TEST METHOD: NORMAL
SERVICE CMNT-IMP: NORMAL
SL AMB NOTE:: NORMAL
SMN1 GENE MUT ANL BLD/T: NORMAL
SPECIMEN SOURCE: NORMAL
TEST PERFORMANCE INFO SPEC: NORMAL

## 2024-05-23 LAB
HGB A MFR BLD: 2.6 % (ref 1.8–3.2)
HGB A MFR BLD: 97.4 % (ref 96.4–98.8)
HGB F MFR BLD: 0 % (ref 0–2)
HGB FRACT BLD-IMP: NORMAL
HGB S MFR BLD: 0 %

## 2024-05-31 ENCOUNTER — INITIAL PRENATAL (OUTPATIENT)
Dept: OBGYN CLINIC | Facility: CLINIC | Age: 29
End: 2024-05-31
Payer: COMMERCIAL

## 2024-05-31 ENCOUNTER — ULTRASOUND (OUTPATIENT)
Dept: PERINATAL CARE | Facility: CLINIC | Age: 29
End: 2024-05-31
Payer: COMMERCIAL

## 2024-05-31 VITALS
BODY MASS INDEX: 33.84 KG/M2 | WEIGHT: 198.2 LBS | DIASTOLIC BLOOD PRESSURE: 70 MMHG | SYSTOLIC BLOOD PRESSURE: 122 MMHG | HEART RATE: 92 BPM | HEIGHT: 64 IN

## 2024-05-31 VITALS
WEIGHT: 197.6 LBS | HEIGHT: 64 IN | DIASTOLIC BLOOD PRESSURE: 76 MMHG | SYSTOLIC BLOOD PRESSURE: 128 MMHG | BODY MASS INDEX: 33.73 KG/M2

## 2024-05-31 DIAGNOSIS — Z3A.14 14 WEEKS GESTATION OF PREGNANCY: ICD-10-CM

## 2024-05-31 DIAGNOSIS — O35.EXX0 FETAL BLADDER OUTLET OBSTRUCTION AFFECTING ANTEPARTUM CARE OF MOTHER, SINGLE OR UNSPECIFIED FETUS: Primary | ICD-10-CM

## 2024-05-31 DIAGNOSIS — Z36.9 ENCOUNTER FOR ANTENATAL SCREENING: ICD-10-CM

## 2024-05-31 DIAGNOSIS — Z34.82 ENCOUNTER FOR SUPERVISION OF NORMAL PREGNANCY IN MULTIGRAVIDA IN SECOND TRIMESTER: Primary | ICD-10-CM

## 2024-05-31 LAB
SL AMB  POCT GLUCOSE, UA: ABNORMAL
SL AMB LEUKOCYTE ESTERASE,UA: ABNORMAL
SL AMB POCT BILIRUBIN,UA: ABNORMAL
SL AMB POCT BLOOD,UA: ABNORMAL
SL AMB POCT CLARITY,UA: ABNORMAL
SL AMB POCT COLOR,UA: ABNORMAL
SL AMB POCT KETONES,UA: ABNORMAL
SL AMB POCT NITRITE,UA: ABNORMAL
SL AMB POCT PH,UA: 6
SL AMB POCT SPECIFIC GRAVITY,UA: 1.02
SL AMB POCT URINE PROTEIN: ABNORMAL
SL AMB POCT UROBILINOGEN: ABNORMAL

## 2024-05-31 PROCEDURE — 87491 CHLMYD TRACH DNA AMP PROBE: CPT | Performed by: PHYSICIAN ASSISTANT

## 2024-05-31 PROCEDURE — 76816 OB US FOLLOW-UP PER FETUS: CPT | Performed by: OBSTETRICS & GYNECOLOGY

## 2024-05-31 PROCEDURE — PNV: Performed by: PHYSICIAN ASSISTANT

## 2024-05-31 PROCEDURE — 81002 URINALYSIS NONAUTO W/O SCOPE: CPT | Performed by: PHYSICIAN ASSISTANT

## 2024-05-31 PROCEDURE — 87591 N.GONORRHOEAE DNA AMP PROB: CPT | Performed by: PHYSICIAN ASSISTANT

## 2024-05-31 PROCEDURE — 87086 URINE CULTURE/COLONY COUNT: CPT | Performed by: PHYSICIAN ASSISTANT

## 2024-05-31 NOTE — PATIENT INSTRUCTIONS
F/u with MFM as planned.    Go for 1 hr glucose test.    Continue PNV.    Continue current doses of Prozac and Zyprexa; f/u with psychiatrist as planned.    Continue smoking cessation.    Stay well hydrated.

## 2024-05-31 NOTE — PROGRESS NOTES
Assessment      Pregnancy 14 and 4/7 weeks     Plan     Problem list reviewed and updated.  Labs reviewed.  Genetic screening tests discussed: results reviewed.  Role of ultrasound in pregnancy discussed; fetal survey: results reviewed.  Amniocentesis discussed: not indicated.  Follow up in 4 weeks.  .  GC/chlamydia screening done.  AFP at next visit.  F/u with MFM as planned.  Order for 1 hr GTT placed.  Continue PNV.  Continue current doses of Prozac and Zyprexa; f/u with psychiatrist as planned.  Continue smoking cessation.  Stay well hydrated.        Subjective   Kimberlyn Wills is a 28 y.o. female being seen today for her obstetrical visit. She is at 14w4d gestation. Patient reports no bleeding, no contractions, no cramping, and no leaking. Fetal movement:  not appreciated yet .  Patient states she is doing well overall.  Has a history of marginal cord insertion with IUGR.  NT scan with MFM showed distended fetal bladder.  Has f/u U/S today.  NIPT was WNL.  Patient states her depression symptoms are stable on her current medication doses; followed by psychiatrist monthly.  PN labs WNL; blood type O positive.  Taking PNV.  Still smoking 2 cigarettes per day.  Denies HA, n/v, reflux, abdominal pain, and swelling.  Pap last year was negative.  Urine dip strongly positive for glucose.    Menstrual History:  OB History          2    Para   1    Term   1            AB        Living   1         SAB        IAB        Ectopic        Multiple        Live Births   1                Menarche age: N/A  Patient's last menstrual period was 2024.       The following portions of the patient's history were reviewed and updated as appropriate: allergies, current medications, past family history, past medical history, past social history, past surgical history, and problem list.    Review of Systems  Pertinent items are noted in HPI.     Objective     /76 (BP Location: Left arm, Patient Position:  "Sitting, Cuff Size: Adult)   Ht 5' 4\" (1.626 m)   Wt 89.6 kg (197 lb 9.6 oz)   LMP 02/19/2024   BMI 33.92 kg/m²   Uterine Size: size equals dates   Pelvic Exam:           Perineum: is normal                 Vulva: normal               Vagina:  normal mucosa                    Cervix: normal                    Uterus: size consistent with 14 weeks              Adnexa: normal adnexa               "

## 2024-05-31 NOTE — PROGRESS NOTES
The patient was seen today for an ultrasound.  Please see ultrasound report (located under Ob Procedures) for additional details.   Thank you very much for allowing us to participate in the care of this very nice patient.  Should you have any questions, please do not hesitate to contact me.     Alon Lyn MD FACOG  Attending Physician, Maternal-Fetal Medicine  [facility]

## 2024-06-02 LAB
BACTERIA UR CULT: ABNORMAL
BACTERIA UR CULT: ABNORMAL

## 2024-06-03 LAB
C TRACH DNA SPEC QL NAA+PROBE: NEGATIVE
N GONORRHOEA DNA SPEC QL NAA+PROBE: NEGATIVE

## 2024-06-04 LAB
CFTR FULL MUT ANL BLD/T SEQ: NORMAL
CITATION REF LAB TEST: NORMAL
CLINICAL INFO: NORMAL
ETHNIC BACKGROUND STATED: NORMAL
GENE DIS ANL CARRIER INTERP-IMP: NORMAL
INDICATION: NORMAL
LAB DIRECTOR NAME PROVIDER: NORMAL
RECOMMENDATION PATIENT DOC-IMP: NORMAL
REF LAB TEST METHOD: NORMAL
SERVICE CMNT-IMP: NORMAL
SPECIMEN SOURCE: NORMAL

## 2024-06-05 ENCOUNTER — TELEPHONE (OUTPATIENT)
Dept: LABOR AND DELIVERY | Facility: HOSPITAL | Age: 29
End: 2024-06-05

## 2024-06-05 DIAGNOSIS — B96.89 BV (BACTERIAL VAGINOSIS): Primary | ICD-10-CM

## 2024-06-05 DIAGNOSIS — N76.0 BV (BACTERIAL VAGINOSIS): Primary | ICD-10-CM

## 2024-06-05 RX ORDER — METRONIDAZOLE 500 MG/1
500 TABLET ORAL EVERY 12 HOURS SCHEDULED
Qty: 10 TABLET | Refills: 0 | Status: SHIPPED | OUTPATIENT
Start: 2024-06-05 | End: 2024-06-12

## 2024-06-05 NOTE — TELEPHONE ENCOUNTER
Spoke with patient regarding results.  GC/chlamydia screening negative.  Urine grew Gardnerella.  Rx for metronidazole 500 mg BID x 7 days sent to pharmacy. Call with problems.

## 2024-06-06 ENCOUNTER — APPOINTMENT (OUTPATIENT)
Dept: LAB | Facility: CLINIC | Age: 29
End: 2024-06-06
Payer: COMMERCIAL

## 2024-06-06 DIAGNOSIS — O24.419 GESTATIONAL DIABETES MELLITUS (GDM) IN SECOND TRIMESTER, GESTATIONAL DIABETES METHOD OF CONTROL UNSPECIFIED: Primary | ICD-10-CM

## 2024-06-06 DIAGNOSIS — Z13.29 SCREENING FOR ENDOCRINE, METABOLIC AND IMMUNITY DISORDER: ICD-10-CM

## 2024-06-06 DIAGNOSIS — Z11.59 NEED FOR HEPATITIS C SCREENING TEST: ICD-10-CM

## 2024-06-06 DIAGNOSIS — Z13.228 SCREENING FOR ENDOCRINE, METABOLIC AND IMMUNITY DISORDER: ICD-10-CM

## 2024-06-06 DIAGNOSIS — Z36.9 UNSPECIFIED ANTENATAL SCREENING: ICD-10-CM

## 2024-06-06 DIAGNOSIS — Z13.0 SCREENING FOR ENDOCRINE, METABOLIC AND IMMUNITY DISORDER: ICD-10-CM

## 2024-06-06 DIAGNOSIS — Z13.0 SCREENING FOR DEFICIENCY ANEMIA: ICD-10-CM

## 2024-06-06 DIAGNOSIS — Z13.1 SCREENING FOR DIABETES MELLITUS (DM): ICD-10-CM

## 2024-06-06 DIAGNOSIS — Z13.220 SCREENING FOR LIPID DISORDERS: ICD-10-CM

## 2024-06-06 LAB
ALBUMIN SERPL BCP-MCNC: 3.8 G/DL (ref 3.5–5)
ALP SERPL-CCNC: 61 U/L (ref 34–104)
ALT SERPL W P-5'-P-CCNC: 14 U/L (ref 7–52)
ANION GAP SERPL CALCULATED.3IONS-SCNC: 6 MMOL/L (ref 4–13)
AST SERPL W P-5'-P-CCNC: 14 U/L (ref 13–39)
BASOPHILS # BLD AUTO: 0.01 THOUSANDS/ÂΜL (ref 0–0.1)
BASOPHILS NFR BLD AUTO: 0 % (ref 0–1)
BILIRUB SERPL-MCNC: 0.34 MG/DL (ref 0.2–1)
BUN SERPL-MCNC: 6 MG/DL (ref 5–25)
CALCIUM SERPL-MCNC: 8.5 MG/DL (ref 8.4–10.2)
CHLORIDE SERPL-SCNC: 105 MMOL/L (ref 96–108)
CO2 SERPL-SCNC: 22 MMOL/L (ref 21–32)
CREAT SERPL-MCNC: 0.52 MG/DL (ref 0.6–1.3)
EOSINOPHIL # BLD AUTO: 0.05 THOUSAND/ÂΜL (ref 0–0.61)
EOSINOPHIL NFR BLD AUTO: 1 % (ref 0–6)
ERYTHROCYTE [DISTWIDTH] IN BLOOD BY AUTOMATED COUNT: 13 % (ref 11.6–15.1)
GFR SERPL CREATININE-BSD FRML MDRD: 130 ML/MIN/1.73SQ M
GLUCOSE 1H P 50 G GLC PO SERPL-MCNC: 187 MG/DL (ref 70–134)
GLUCOSE P FAST SERPL-MCNC: 193 MG/DL (ref 65–99)
HCT VFR BLD AUTO: 34.6 % (ref 34.8–46.1)
HGB BLD-MCNC: 11.6 G/DL (ref 11.5–15.4)
IMM GRANULOCYTES # BLD AUTO: 0.02 THOUSAND/UL (ref 0–0.2)
IMM GRANULOCYTES NFR BLD AUTO: 0 % (ref 0–2)
LYMPHOCYTES # BLD AUTO: 1.93 THOUSANDS/ÂΜL (ref 0.6–4.47)
LYMPHOCYTES NFR BLD AUTO: 35 % (ref 14–44)
MCH RBC QN AUTO: 30.7 PG (ref 26.8–34.3)
MCHC RBC AUTO-ENTMCNC: 33.5 G/DL (ref 31.4–37.4)
MCV RBC AUTO: 92 FL (ref 82–98)
MONOCYTES # BLD AUTO: 0.27 THOUSAND/ÂΜL (ref 0.17–1.22)
MONOCYTES NFR BLD AUTO: 5 % (ref 4–12)
NEUTROPHILS # BLD AUTO: 3.21 THOUSANDS/ÂΜL (ref 1.85–7.62)
NEUTS SEG NFR BLD AUTO: 59 % (ref 43–75)
NRBC BLD AUTO-RTO: 0 /100 WBCS
PLATELET # BLD AUTO: 221 THOUSANDS/UL (ref 149–390)
PMV BLD AUTO: 9.9 FL (ref 8.9–12.7)
POTASSIUM SERPL-SCNC: 4 MMOL/L (ref 3.5–5.3)
PROT SERPL-MCNC: 6.3 G/DL (ref 6.4–8.4)
RBC # BLD AUTO: 3.78 MILLION/UL (ref 3.81–5.12)
SODIUM SERPL-SCNC: 133 MMOL/L (ref 135–147)
TSH SERPL DL<=0.05 MIU/L-ACNC: 0.88 UIU/ML (ref 0.45–4.5)
WBC # BLD AUTO: 5.49 THOUSAND/UL (ref 4.31–10.16)

## 2024-06-06 PROCEDURE — 36415 COLL VENOUS BLD VENIPUNCTURE: CPT

## 2024-06-06 PROCEDURE — 85025 COMPLETE CBC W/AUTO DIFF WBC: CPT

## 2024-06-06 PROCEDURE — 80053 COMPREHEN METABOLIC PANEL: CPT

## 2024-06-06 PROCEDURE — 86803 HEPATITIS C AB TEST: CPT

## 2024-06-06 PROCEDURE — 82950 GLUCOSE TEST: CPT

## 2024-06-06 PROCEDURE — 84443 ASSAY THYROID STIM HORMONE: CPT

## 2024-06-07 LAB — HCV AB SER QL: NORMAL

## 2024-06-14 ENCOUNTER — ROUTINE PRENATAL (OUTPATIENT)
Dept: OBGYN CLINIC | Facility: CLINIC | Age: 29
End: 2024-06-14

## 2024-06-14 VITALS
SYSTOLIC BLOOD PRESSURE: 120 MMHG | WEIGHT: 199 LBS | HEIGHT: 64 IN | BODY MASS INDEX: 33.97 KG/M2 | DIASTOLIC BLOOD PRESSURE: 64 MMHG

## 2024-06-14 DIAGNOSIS — Z34.82 ENCOUNTER FOR SUPERVISION OF NORMAL PREGNANCY IN MULTIGRAVIDA IN SECOND TRIMESTER: Primary | ICD-10-CM

## 2024-06-14 DIAGNOSIS — Z36.9 ENCOUNTER FOR ANTENATAL SCREENING: ICD-10-CM

## 2024-06-14 PROBLEM — Z3A.14 14 WEEKS GESTATION OF PREGNANCY: Status: RESOLVED | Noted: 2024-05-31 | Resolved: 2024-06-14

## 2024-06-14 PROBLEM — O24.410 DIET CONTROLLED GESTATIONAL DIABETES MELLITUS (GDM) IN SECOND TRIMESTER: Status: ACTIVE | Noted: 2024-06-14

## 2024-06-14 PROCEDURE — PNV: Performed by: PHYSICIAN ASSISTANT

## 2024-06-14 NOTE — PATIENT INSTRUCTIONS
Go for blood work.    F/u with MFM and diabetic counselor as planned.    Continue PNV and medications.    Stay well hydrated.

## 2024-06-20 ENCOUNTER — TELEMEDICINE (OUTPATIENT)
Facility: HOSPITAL | Age: 29
End: 2024-06-20
Payer: COMMERCIAL

## 2024-06-20 VITALS — HEIGHT: 64 IN | BODY MASS INDEX: 33.97 KG/M2 | WEIGHT: 199 LBS

## 2024-06-20 DIAGNOSIS — O99.212 OBESITY AFFECTING PREGNANCY IN SECOND TRIMESTER, UNSPECIFIED OBESITY TYPE: ICD-10-CM

## 2024-06-20 DIAGNOSIS — Z3A.17 17 WEEKS GESTATION OF PREGNANCY: ICD-10-CM

## 2024-06-20 DIAGNOSIS — O24.419 GESTATIONAL DIABETES MELLITUS (GDM) IN SECOND TRIMESTER, GESTATIONAL DIABETES METHOD OF CONTROL UNSPECIFIED: Primary | ICD-10-CM

## 2024-06-20 PROCEDURE — 99417 PROLNG OP E/M EACH 15 MIN: CPT | Performed by: NURSE PRACTITIONER

## 2024-06-20 PROCEDURE — 99215 OFFICE O/P EST HI 40 MIN: CPT | Performed by: NURSE PRACTITIONER

## 2024-06-20 RX ORDER — BLOOD-GLUCOSE METER
EACH MISCELLANEOUS
Qty: 1 KIT | Refills: 0 | Status: SHIPPED | OUTPATIENT
Start: 2024-06-20

## 2024-06-20 RX ORDER — BLOOD SUGAR DIAGNOSTIC
STRIP MISCELLANEOUS
Qty: 100 EACH | Refills: 5 | Status: SHIPPED | OUTPATIENT
Start: 2024-06-20

## 2024-06-20 RX ORDER — LANCETS 33 GAUGE
EACH MISCELLANEOUS
Qty: 100 EACH | Refills: 5 | Status: SHIPPED | OUTPATIENT
Start: 2024-06-20

## 2024-06-20 NOTE — PATIENT INSTRUCTIONS
-Check A1c. Last CMP within normal except for glucose.   -A1c goal is 5.6% or less.  -Follow up with dietitian.  -Keep 3 day food log to review with dietitian.  -Start self monitoring blood glucose (SMBG) fasting; 2 hours after start of each meal and with hypoglycemia.   -Glucose goals: fasting 60-95 mg/dL, 140 mg/dL or less 1 hour post meals, and 120 mg/dL or less 2 hours post meal.   -Report glucose readings weekly via [employer] every Thursday.   -Start GDM 1800 calorie meal plan with 3 meals and 3 snacks including recommended combination of carb, protein and fat per meal/snack.  -Please eat meal or snack every 2-3.5 hours while awake.  -No more than 8 to 10 hours of fasting overnight.  -Refer to [employer] MyPlate online as a reference.  -2nd/3rd trimester minimum total daily carbohydrates 175 grams paired with half grams in protein.   -Stay active if no restriction from your OB, walk up to 30 minutes a day.  -Always have glucose available to treat hypoglycemia. Use 15:15 rule.   -Refer to hypoglycemia patient education sheet. SMBG when experiencing signs and symptoms of hypoglycemia and prior to driving.   -Serial fetal growth ultrasounds.  -20 weeks detailed fetal growth ultrasound.  -22-24 weeks fetal echo.  -If diabetes related medications are started, at 32 weeks gestation; NST twice a week and ERNESTO weekly.   -Continue prenatal vitamin as recommended.  -At 36 weeks gestation, stop baby aspirin.   -Continue follow-up with your OB and MFM as recommended.  -Stay in close contact with diabetes education team.  -Insulin requirements during pregnancy; basal/bolus concept and Metformin discussed.  -Very important to maintain tight glucose control during pregnancy to decrease risk factors including fetal macrosomia; birth injury; risk of ; polyhydramnios; pre-term labor; pre-eclampsia;  hypoglycemia; jaundice and stillbirth.   -Diabetes and pregnancy booklet; meal plan and hypoglycemia patient  education.

## 2024-06-20 NOTE — ASSESSMENT & PLAN NOTE
-Pre-pregnancy weight 199 lbs. BMI 34.14.  -Current weight 199 lbs.  -Recommended weight gain 11 to 20 lbs.  -Start GDM meal plan and follow with dietitian.

## 2024-06-20 NOTE — PROGRESS NOTES
Virtual Regular Visit    Verification of patient location:    Patient is located at Home in the following state in which I hold an active license [place]      Assessment/Plan:    Problem List Items Addressed This Visit       Gestational diabetes mellitus (GDM) in second trimester - Primary     -Check A1c. Last CMP within normal except for glucose.   -A1c goal is 5.6% or less.  -Follow up with dietitian.  -Keep 3 day food log to review with dietitian.  -Start self monitoring blood glucose (SMBG) fasting; 2 hours after start of each meal and with hypoglycemia.   -Glucose goals: fasting 60-95 mg/dL, 140 mg/dL or less 1 hour post meals, and 120 mg/dL or less 2 hours post meal.   -Report glucose readings weekly via [employer] every Thursday.   -Start GDM 1800 calorie meal plan with 3 meals and 3 snacks including recommended combination of carb, protein and fat per meal/snack.  -Please eat meal or snack every 2-3.5 hours while awake.  -No more than 8 to 10 hours of fasting overnight.  -Refer to Sweet Success MyPlate online as a reference.  -2nd/3rd trimester minimum total daily carbohydrates 175 grams paired with half grams in protein.   -Stay active if no restriction from your OB, walk up to 30 minutes a day.  -Always have glucose available to treat hypoglycemia. Use 15:15 rule.   -Refer to hypoglycemia patient education sheet. SMBG when experiencing signs and symptoms of hypoglycemia and prior to driving.   -Serial fetal growth ultrasounds.  -20 weeks detailed fetal growth ultrasound.  -22-24 weeks fetal echo.  -If diabetes related medications are started, at 32 weeks gestation; NST twice a week and ERNESTO weekly.   -Continue prenatal vitamin as recommended.  -At 36 weeks gestation, stop baby aspirin.   -Continue follow-up with your OB and MFM as recommended.  -Stay in close contact with diabetes education team.  -Insulin requirements during pregnancy; basal/bolus concept and Metformin discussed.  -Very important to maintain tight  glucose control during pregnancy to decrease risk factors including fetal macrosomia; birth injury; risk of ; polyhydramnios; pre-term labor; pre-eclampsia;  hypoglycemia; jaundice and stillbirth.   -Diabetes and pregnancy booklet; meal plan and hypoglycemia patient education.      Lab Results   Component Value Date    HGBA1C 5.3 2024            Relevant Medications    Blood Glucose Monitoring Suppl (OneTouch Verio Flex System) w/Device KIT    OneTouch Delica Lancets 33G MISC    OneTouch Verio test strip    Other Relevant Orders    Mychart glucose flowsheet    Comprehensive metabolic panel    Hemoglobin A1C    17 weeks gestation of pregnancy    Relevant Medications    Blood Glucose Monitoring Suppl (OneTouch Verio Flex System) w/Device KIT    OneTouch Delica Lancets 33G MISC    OneTouch Verio test strip    Other Relevant Orders    Mychart glucose flowsheet    Comprehensive metabolic panel    Hemoglobin A1C    Obesity complicating pregnancy in second trimester     -Pre-pregnancy weight 199 lbs. BMI 34.14.  -Current weight 199 lbs.  -Recommended weight gain 11 to 20 lbs.  -Start GDM meal plan and follow with dietitian.          Relevant Medications    Blood Glucose Monitoring Suppl (OneTouch Verio Flex System) w/Device KIT    OneTouch Delica Lancets 33G MISC    OneTouch Verio test strip    Other Relevant Orders    Mychart glucose flowsheet    Comprehensive metabolic panel    Hemoglobin A1C    BMI 34.0-34.9,adult    Relevant Medications    Blood Glucose Monitoring Suppl (OneTouch Verio Flex System) w/Device KIT    OneTouch Delica Lancets 33G MISC    OneTouch Verio test strip    Other Relevant Orders    Mychart glucose flowsheet    Comprehensive metabolic panel    Hemoglobin A1C            Reason for visit is   Chief Complaint   Patient presents with    Gestational Diabetes    Patient Education    Virtual Regular Visit          Encounter provider JR Schulte      Recent Visits  No visits were  found meeting these conditions.  Showing recent visits within past 7 days and meeting all other requirements  Today's Visits  Date Type Provider Dept   24 Telemedicine JR Schutle An    Showing today's visits and meeting all other requirements  Future Appointments  No visits were found meeting these conditions.  Showing future appointments within next 150 days and meeting all other requirements       The patient was identified by name and date of birth. Kimberlyn Wills was informed that this is a telemedicine visit and that the visit is being conducted through the Epic Embedded platform. She agrees to proceed..  My office door was closed. No one else was in the room.  She acknowledged consent and understanding of privacy and security of the video platform. The patient has agreed to participate and understands they can discontinue the visit at any time.    Patient is aware this is a billable service.     Subjective  Kimberlyn Wills is a 28 y.o. female  17 3/7 weeks GDM. No history of GDM or glucose intolerance. No family history of diabetes. Last pregnancy issue with IUGR. Current smoker- down to 2 cigs a day. Asthma as a child.   No weight changes up to date.     Wakes up on work day at 6:30 to 7 PM, eats dinner with family; tacos or spaghetti or cooking on the girl;  then a snack at 2 AM- apple with PB, 5 AM- Yony Boone sausage biscuit sandwich  Goes to be bed at 9 AM during work hours.  Wakes up at 8 AM on off days, eats at 9 AM breakfast sandwich, 12 to 1 PM lunch, dinner 5 to 6 PM, snack around 9 to 10 PM.  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          Past Medical History:   Diagnosis Date    Asthma     Bipolar 2 disorder (HCC)     History of postpartum depression     Psychiatric disorder     depression and anxiety       Past Surgical History:   Procedure Laterality Date    DENTAL IMPLANT      ORTHOPEDIC SURGERY   "2020    right knee    WISDOM TOOTH EXTRACTION         Current Outpatient Medications   Medication Sig Dispense Refill    Blood Glucose Monitoring Suppl (OneTouch Verio Flex System) w/Device KIT Dispense 1 kit per insurance formulary. 1 kit 0    OneTouch Delica Lancets 33G MISC Use 4 a day or as directed. 100 each 5    OneTouch Verio test strip Test 4 times a day and as instructed. Gestational diabetes. 100 each 5    FLUoxetine (PROzac) 20 mg capsule Take 20 mg by mouth daily      OLANZapine (ZyPREXA) 5 mg tablet Take 7.5 mg by mouth      Prenatal Multivit-Min-Fe-FA (PRE- PO) Take by mouth       No current facility-administered medications for this visit.        No Known Allergies    Review of Systems   Constitutional:  Positive for fatigue. Negative for fever.   HENT:  Negative for congestion and trouble swallowing.    Eyes:  Negative for visual disturbance.   Respiratory:  Negative for cough and shortness of breath.    Cardiovascular:  Negative for chest pain, palpitations and leg swelling.   Gastrointestinal:  Positive for nausea (intermittently). Negative for constipation and vomiting.   Endocrine: Negative for polydipsia, polyphagia and polyuria.   Genitourinary:  Negative for difficulty urinating and vaginal bleeding.   Musculoskeletal:  Negative for back pain.   Skin:  Negative for rash.   Neurological:  Negative for headaches.   Psychiatric/Behavioral:  Negative for sleep disturbance.        Video Exam    Vitals:    24 0900   Weight: 90.3 kg (199 lb)   Height: 5' 4\" (1.626 m)       Physical Exam  HENT:      Head: Normocephalic.      Nose: Nose normal.   Eyes:      Conjunctiva/sclera: Conjunctivae normal.   Pulmonary:      Effort: Pulmonary effort is normal.   Neurological:      Mental Status: She is alert and oriented to person, place, and time.   Psychiatric:         Behavior: Behavior normal.         Thought Content: Thought content normal.         Judgment: Judgment normal.          Visit " Time  Total Visit Duration: 60 minutes with patient and 10 minutes charting.

## 2024-06-20 NOTE — ASSESSMENT & PLAN NOTE
-Check A1c. Last CMP within normal except for glucose.   -A1c goal is 5.6% or less.  -Follow up with dietitian.  -Keep 3 day food log to review with dietitian.  -Start self monitoring blood glucose (SMBG) fasting; 2 hours after start of each meal and with hypoglycemia.   -Glucose goals: fasting 60-95 mg/dL, 140 mg/dL or less 1 hour post meals, and 120 mg/dL or less 2 hours post meal.   -Report glucose readings weekly via [employer] every Thursday.   -Start GDM 1800 calorie meal plan with 3 meals and 3 snacks including recommended combination of carb, protein and fat per meal/snack.  -Please eat meal or snack every 2-3.5 hours while awake.  -No more than 8 to 10 hours of fasting overnight.  -Refer to [employer] Success MyPlate online as a reference.  -2nd/3rd trimester minimum total daily carbohydrates 175 grams paired with half grams in protein.   -Stay active if no restriction from your OB, walk up to 30 minutes a day.  -Always have glucose available to treat hypoglycemia. Use 15:15 rule.   -Refer to hypoglycemia patient education sheet. SMBG when experiencing signs and symptoms of hypoglycemia and prior to driving.   -Serial fetal growth ultrasounds.  -20 weeks detailed fetal growth ultrasound.  -22-24 weeks fetal echo.  -If diabetes related medications are started, at 32 weeks gestation; NST twice a week and ERNESTO weekly.   -Continue prenatal vitamin as recommended.  -At 36 weeks gestation, stop baby aspirin.   -Continue follow-up with your OB and MFM as recommended.  -Stay in close contact with diabetes education team.  -Insulin requirements during pregnancy; basal/bolus concept and Metformin discussed.  -Very important to maintain tight glucose control during pregnancy to decrease risk factors including fetal macrosomia; birth injury; risk of ; polyhydramnios; pre-term labor; pre-eclampsia;  hypoglycemia; jaundice and stillbirth.   -Diabetes and pregnancy booklet; meal plan and hypoglycemia patient  education.      Lab Results   Component Value Date    HGBA1C 5.3 03/13/2024

## 2024-06-28 ENCOUNTER — TELEMEDICINE (OUTPATIENT)
Dept: PERINATAL CARE | Facility: CLINIC | Age: 29
End: 2024-06-28
Payer: COMMERCIAL

## 2024-06-28 DIAGNOSIS — Z3A.18 18 WEEKS GESTATION OF PREGNANCY: ICD-10-CM

## 2024-06-28 DIAGNOSIS — O24.410 DIET CONTROLLED GESTATIONAL DIABETES MELLITUS (GDM) IN SECOND TRIMESTER: Primary | ICD-10-CM

## 2024-06-28 PROCEDURE — G0108 DIAB MANAGE TRN  PER INDIV: HCPCS

## 2024-06-28 NOTE — PROGRESS NOTES
"CLASS 2 - Individual  (virtual visit)    Thank you for referring your patient to [facility] Maternal Fetal Medicine Diabetes and Pregnancy Program.     Kimberlyn Wills is a  28 y.o. female who presents today unaccompanied for Virtual Regular Visit, Patient Education, Gestational Diabetes, and Virtual Regular Visit.  Patient is at 18w4d gestation, Estimated Date of Delivery: 24.     Visit Diagnosis:  Encounter Diagnosis     ICD-10-CM    1. Diet controlled gestational diabetes mellitus (GDM) in second trimester  O24.410 Hemoglobin A1C     Comprehensive metabolic panel      2. 18 weeks gestation of pregnancy  Z3A.18 Hemoglobin A1C     Comprehensive metabolic panel         Reviewed and updated the following from patients medical record: PMH, Problem List, Allergies, and Current Medications.    Labs  GDM LABS: See Class 1 Note    A1C:  Lab Results   Component Value Date/Time    HGBA1C 5.3 2024 10:49 AM    HGBA1C 5.2 07/15/2022 10:15 AM      Labs Ordered This Visit:  Reordered labs A1c + CMP d/t pt requesting to complete orders at [facility] instead of [facility]    Current Medications:    Current Outpatient Medications:     Blood Glucose Monitoring Suppl (OneTouch Verio Flex System) w/Device KIT, Dispense 1 kit per insurance formulary., Disp: 1 kit, Rfl: 0    FLUoxetine (PROzac) 20 mg capsule, Take 20 mg by mouth daily, Disp: , Rfl:     OLANZapine (ZyPREXA) 5 mg tablet, Take 7.5 mg by mouth, Disp: , Rfl:     OneTouch Delica Lancets 33G MISC, Use 4 a day or as directed., Disp: 100 each, Rfl: 5    OneTouch Verio test strip, Test 4 times a day and as instructed. Gestational diabetes., Disp: 100 each, Rfl: 5    Prenatal Multivit-Min-Fe-FA (PRE- PO), Take by mouth, Disp: , Rfl:      Anthropometrics:  Ht Readings from Last 1 Encounters:   24 5' 4\" (1.626 m)      Wt Readings from Last 3 Encounters:   24 90.3 kg (199 lb)   24 90.3 kg (199 lb)   24 89.9 kg (198 lb 3.2 oz)        Pre-Gravid Wt " Pre-Gravid BMI TWG   90.3 kg (199 lb) 34.14 0 kg (0 lb)     Total Pregnancy Weight Gain Recommendations: BMI (> 30) 11-20 lbs  Current Wt Status Compared to Recommendations: Less Than -- Recommended to avoid additional weight loss till delivery    Most Recent Ultrasound Results:  Findings: NML Growth/ERNESTO  Next US date: Scheduled Appropriately    BLOOD GLUCOSE MONITORING:   Glucometer: OneTouch Verio Flex     Reinforced at Today's Visit:   Timing/Frequency of SMB x per day (Fasting, 2 hour after start of each meal)  Goals: (Fasting) 60-95mg/dL // (2hr PP) <120mg/dL  Reporting Guidelines: Weekly via Phone: [phone] OR My Chart (Message with image attachment) OR Glucose Flowsheet  Method of Reporting: [employer] Glucose Flowsheet    BG LOG:         FBG last two days was 113 and 115.(when switching from day to night shift)     Review of Blood Glucose Log:   FBG =  >95 when switching from day to night shift  Post-Prandial BG = Well controlled; skipping testing after dinner    MEAL PLAN (Patient was provided with a meal plan including 3 meals and 3 snacks at class 1)  *Calories: 2000 calorie (CHO:45-15-60-15-60-30) (PRO: 3-1-3/4-1-3/4-2)    Review of Patient's Current Diet: refer to class 1 note for additional details  Beverages: No Sugar Sweetened Beverages  Dining Out Frequency: Rarely    Meal Plan Recommendations Compliant? Comments:    Consistent CHO Intake Yes     3 Meals and 3 Snacks No  - Sometimes skipping bedtime snack d/t late dinner   Protein w/ Every Meal and Snack Yes     Eating every 2-3.5hrs while awake  Yes     8-10hrs Fasting (from time of bedtime snack until first meal of the day) No   - Exceeding 10hrs fasting when working overnights     Reinforced Diet Instructions:  Individualized meal plan.   Importance of consistent carbohydrate intake via 3 meals and 3 snacks per day   Importance of protein as it relates to blood glucose control.   Encouraged  patient to eat every 2.0-3.5 hours while  "awake  Encouraged patient to go no longer than 8-10 hours fasting overnight until first meal of the day.  Provided suggested meal/snack options to increase nutrition and maintain consistent meal and snack intakes.    Physical Activity:  Currently physically active? Yes, Walking daily    Reviewed w/ Pt:   Benefits of physical activity to optimize blood glucose control, encouraged activity at patient is physically able.   Instructed pt to always consult a physician prior to starting an exercise program.   Recommend 20-30 minutes daily.    Additional Topics Reviewed:    Medications: (reviewed options available with pt)  Discussed if blood sugars are not within normal range with meal planning and exercise  Reviewed medication such as metformin and/or basal/bolus insulin may be needed for better glucose control  Maternal-Fetal Testing:   Ultrasounds: growth scans every 4 weeks.  NST: twice weekly starting at 32nd week GA  ERNESTO:  weekly starting at 32 weeks GA  Sick day Guidelines:   Advised that sickness will raise blood sugar   If blood sugar is > 160 mg/dL twice in one day call doctor  If on diabetes medications, continue as instructed   If unable to consume normal meal plan, instructed to remain well hydrated   Hypoglycemia & Treatment Guidelines:  Reviewed what hypoglycemia is, signs and symptoms, and how to treat via the 15:15 rule.  Post-Partum Guidelines:  Completion of 75 gm CHO 2 hr gtt at 6 weeks post-partum to check for Type 2 DM diagnosis  Breastfeeding Guidelines:  Continue GDM meal plan plus additional 350-500 calories daily  Examples of protein and carbohydrate snacks provided.  Stay hydrated by drinking 8-10 (8 oz.) fluids daily.  Dining Out & Travel Guidelines:  Patient advised to be prepared with extra diabetes supplies, medications, and snacks, as well as sticking to the same time schedule and portions eaten at home for meals and snacks.    Patient Stated Goal: \"I will eat 3 meals and 3 snacks each day, " "including protein at each\"  Goal Assessment: Not on track    Diabetes Self Management Support Plan outside of ongoing care: Spouse/Family    Barriers to Learning/Change:  Work Schedule (works nights)  Expected Compliance: good    Date to report blood sugars: Weekly   Follow up:  Return for per review of weekly blood sugar log.     Begin Time: 1:00pm  End Time: 2:00pm    It was a pleasure working with them today. Please feel free to call [phone]) with any questions or concerns.    Cinthia Lorenzo RD   Diabetes Educator  [facility] Maternal Fetal Medicine  Diabetes and Pregnancy Program  [address] [place] [address], [address]  [place] [address]    Virtual Regular Visit    Verification of patient location:    Patient is located at Home in the following state in which I hold an active license [place]      Assessment/Plan:    Problem List Items Addressed This Visit          Pregnancy    17 weeks gestation of pregnancy       Other    Diet controlled gestational diabetes mellitus (GDM) in second trimester - Primary    Relevant Orders    Hemoglobin A1C    Comprehensive metabolic panel            Reason for visit is   Chief Complaint   Patient presents with    Virtual Regular Visit    Patient Education    Gestational Diabetes    Virtual Regular Visit          Encounter provider Cinthia Lorenzo RD      Recent Visits  No visits were found meeting these conditions.  Showing recent visits within past 7 days and meeting all other requirements  Today's Visits  Date Type Provider Dept   24 Telemedicine Cinthia Lorenzo RD   Center   Showing today's visits and meeting all other requirements  Future Appointments  No visits were found meeting these conditions.  Showing future appointments within next 150 days and meeting all other requirements       The patient was identified by name and date of birth. Kimberlyn Wills was informed that this is a telemedicine visit and that the visit is being conducted through the Epic Embedded " platform. She agrees to proceed..  My office door was closed. No one else was in the room.  She acknowledged consent and understanding of privacy and security of the video platform. The patient has agreed to participate and understands they can discontinue the visit at any time.    Patient is aware this is a billable service.     Subjective  Kimberlyn Wills is a 28 y.o. female pregnant.      HPI     Past Medical History:   Diagnosis Date    Asthma     Bipolar 2 disorder (HCC)     History of postpartum depression     Psychiatric disorder     depression and anxiety       Past Surgical History:   Procedure Laterality Date    DENTAL IMPLANT      ORTHOPEDIC SURGERY      right knee    WISDOM TOOTH EXTRACTION         Current Outpatient Medications   Medication Sig Dispense Refill    Blood Glucose Monitoring Suppl (OneTouch Verio Flex System) w/Device KIT Dispense 1 kit per insurance formulary. 1 kit 0    FLUoxetine (PROzac) 20 mg capsule Take 20 mg by mouth daily      OLANZapine (ZyPREXA) 5 mg tablet Take 7.5 mg by mouth      OneTouch Delica Lancets 33G MISC Use 4 a day or as directed. 100 each 5    OneTouch Verio test strip Test 4 times a day and as instructed. Gestational diabetes. 100 each 5    Prenatal Multivit-Min-Fe-FA (PRE- PO) Take by mouth       No current facility-administered medications for this visit.        No Known Allergies    Review of Systems    Video Exam    There were no vitals filed for this visit.    Physical Exam     Visit Time  Total Visit Duration: 60min

## 2024-07-11 ENCOUNTER — ROUTINE PRENATAL (OUTPATIENT)
Dept: OBGYN CLINIC | Facility: CLINIC | Age: 29
End: 2024-07-11

## 2024-07-11 VITALS
BODY MASS INDEX: 34.11 KG/M2 | SYSTOLIC BLOOD PRESSURE: 112 MMHG | HEIGHT: 64 IN | WEIGHT: 199.8 LBS | DIASTOLIC BLOOD PRESSURE: 68 MMHG

## 2024-07-11 DIAGNOSIS — Z34.92 PRENATAL CARE IN SECOND TRIMESTER: Primary | ICD-10-CM

## 2024-07-11 PROCEDURE — PNV: Performed by: OBSTETRICS & GYNECOLOGY

## 2024-07-11 NOTE — PROGRESS NOTES
Patient reports good fm, no v, headache, cramping, bleeding, loss of fluid, dom violence, or smoking.  gilberto pnv some nausea and edema,   -Has  center follow-up next scan tomorrow  -Following glucose with  center  -Aware only has still 21 weeks to get AFP will get AFP with HGBA1c and CMP  -Return in 4 weeks or sooner as needed

## 2024-07-12 ENCOUNTER — ROUTINE PRENATAL (OUTPATIENT)
Dept: PERINATAL CARE | Facility: CLINIC | Age: 29
End: 2024-07-12
Payer: COMMERCIAL

## 2024-07-12 ENCOUNTER — APPOINTMENT (OUTPATIENT)
Dept: LAB | Facility: CLINIC | Age: 29
End: 2024-07-12
Payer: COMMERCIAL

## 2024-07-12 VITALS
SYSTOLIC BLOOD PRESSURE: 110 MMHG | WEIGHT: 197.6 LBS | HEART RATE: 91 BPM | BODY MASS INDEX: 33.92 KG/M2 | DIASTOLIC BLOOD PRESSURE: 62 MMHG

## 2024-07-12 DIAGNOSIS — Z3A.18 18 WEEKS GESTATION OF PREGNANCY: ICD-10-CM

## 2024-07-12 DIAGNOSIS — Z36.86 ENCOUNTER FOR ANTENATAL SCREENING FOR CERVICAL LENGTH: ICD-10-CM

## 2024-07-12 DIAGNOSIS — Z36.89 ENCOUNTER FOR FETAL ANATOMIC SURVEY: ICD-10-CM

## 2024-07-12 DIAGNOSIS — Z36.9 UNSPECIFIED ANTENATAL SCREENING: ICD-10-CM

## 2024-07-12 DIAGNOSIS — O24.410 DIET CONTROLLED GESTATIONAL DIABETES MELLITUS (GDM) IN SECOND TRIMESTER: ICD-10-CM

## 2024-07-12 DIAGNOSIS — Z3A.20 20 WEEKS GESTATION OF PREGNANCY: Primary | ICD-10-CM

## 2024-07-12 LAB
EST. AVERAGE GLUCOSE BLD GHB EST-MCNC: 97 MG/DL
HBA1C MFR BLD: 5 %

## 2024-07-12 PROCEDURE — 76811 OB US DETAILED SNGL FETUS: CPT | Performed by: OBSTETRICS & GYNECOLOGY

## 2024-07-12 PROCEDURE — 83036 HEMOGLOBIN GLYCOSYLATED A1C: CPT

## 2024-07-12 PROCEDURE — 76817 TRANSVAGINAL US OBSTETRIC: CPT | Performed by: OBSTETRICS & GYNECOLOGY

## 2024-07-12 PROCEDURE — 82105 ALPHA-FETOPROTEIN SERUM: CPT

## 2024-07-12 PROCEDURE — 99214 OFFICE O/P EST MOD 30 MIN: CPT | Performed by: OBSTETRICS & GYNECOLOGY

## 2024-07-12 PROCEDURE — 36415 COLL VENOUS BLD VENIPUNCTURE: CPT

## 2024-07-12 NOTE — LETTER
"2024     Mini Stevens MD  [address] [facility] [place] [address]    Patient: Kimberlyn Wills   YOB: 1995   Date of Visit: 2024       Dear Dr. Stevens:    Thank you for referring Kimberlyn Wills to me for evaluation. Below are my notes for this consultation.    If you have questions, please do not hesitate to call me. I look forward to following your patient along with you.         Sincerely,        Chari Yeung MD        CC: No Recipients    Chari Yeung MD  2024  4:07 PM  Sign when Signing Visit  [facility]: Kimberlyn Wills was seen today at 20w4d for anatomic survey and cervical length screening ultrasound.  See ultrasound report under \"OB Procedures\" tab.  Please don't hesitate to contact our office with any concerns or questions.  -Chari Yeung MD         "

## 2024-07-12 NOTE — PROGRESS NOTES
Ultrasound Probe Disinfection    A transvaginal ultrasound was performed.   Prior to use, disinfection was performed with High Level Disinfection Process ([employer]).  Probe serial number [id number] was used.      Fanny Bernard  07/12/24  2:29 PM

## 2024-07-12 NOTE — PROGRESS NOTES
"[facility]: Kimberlyn Wills was seen today at 20w4d for anatomic survey and cervical length screening ultrasound.  See ultrasound report under \"OB Procedures\" tab.  Please don't hesitate to contact our office with any concerns or questions.  -Chari Yeung MD      "

## 2024-07-15 LAB
2ND TRIMESTER 4 SCREEN SERPL-IMP: NORMAL
AFP ADJ MOM SERPL: 1.74
AFP INTERP AMN-IMP: NORMAL
AFP INTERP SERPL-IMP: NORMAL
AFP INTERP SERPL-IMP: NORMAL
AFP SERPL-MCNC: 92.4 NG/ML
AGE AT DELIVERY: 29.2 YR
GA METHOD: NORMAL
GA: 20.6 WEEKS
IDDM PATIENT QL: NO
MULTIPLE PREGNANCY: NO
NEURAL TUBE DEFECT RISK FETUS: 1477 %

## 2024-07-18 ENCOUNTER — TELEMEDICINE (OUTPATIENT)
Facility: HOSPITAL | Age: 29
End: 2024-07-18
Payer: COMMERCIAL

## 2024-07-18 DIAGNOSIS — O24.414 INSULIN CONTROLLED GESTATIONAL DIABETES MELLITUS (GDM) IN SECOND TRIMESTER: Primary | ICD-10-CM

## 2024-07-18 DIAGNOSIS — Z3A.21 21 WEEKS GESTATION OF PREGNANCY: ICD-10-CM

## 2024-07-18 DIAGNOSIS — O99.212 OBESITY AFFECTING PREGNANCY IN SECOND TRIMESTER, UNSPECIFIED OBESITY TYPE: ICD-10-CM

## 2024-07-18 PROCEDURE — 99215 OFFICE O/P EST HI 40 MIN: CPT | Performed by: NURSE PRACTITIONER

## 2024-07-18 RX ORDER — INSULIN GLARGINE 100 [IU]/ML
14 INJECTION, SOLUTION SUBCUTANEOUS
Qty: 15 ML | Refills: 0 | Status: SHIPPED | OUTPATIENT
Start: 2024-07-18

## 2024-07-18 NOTE — ASSESSMENT & PLAN NOTE
-Due to FBS >95, start insulin glargine 14 units at 9 PM daily. Set an alarm.  -Always have bedtime snack with at least 15 grams of carbohydrates with 21 grams or 3 ounces of protein.  -Send a message on Monday, 7/23/2024 for glucose readings to be reviewed.    -Continue GDM meal plan, eating every 2 to 3 hours while awake and no more than 8 to 10 hours during sleep hours.  -Minimum total daily carbohydrates 175 grams paired with half the grams in protein.   -Continue SMBG fasting, 2 hours post start of each meal and with hypoglycemia.   -Glucose goals fasting 60-90 mg/dL and 2 hours post start of each meal 120 or less.  -Always have glucose available for hypoglycemia, use 15 by 15 rule.  -Continue follow up with OB and MFM as recommended.  -Serial fetal growth ultrasounds.  -22-24 weeks fetal echo.  -Starting at 32 weeks gestation, NST twice a week and ERNESTO weekly.   -Stay in close contact with diabetes team.   Lab Results   Component Value Date    HGBA1C 5.0 07/12/2024

## 2024-07-18 NOTE — ASSESSMENT & PLAN NOTE
-Pre-pregnancy weight 199 lbs. BMI 34.14.  -Current weight 197 lbs. BMI 34.14.   -Recommended weight gain 11 to 20 lbs.  -Continue GDM meal plan and follow with dietitian.

## 2024-07-18 NOTE — PATIENT INSTRUCTIONS
-Due to FBS >95, start insulin glargine 14 units at 9 PM daily. Set an alarm.  -Always have bedtime snack with at least 15 grams of carbohydrates with 21 grams or 3 ounces of protein.  -Send a message on Monday, 7/23/2024 for glucose readings to be reviewed.    -Continue GDM meal plan, eating every 2 to 3 hours while awake and no more than 8 to 10 hours during sleep hours.  -Minimum total daily carbohydrates 175 grams paired with half the grams in protein.   -Continue SMBG fasting, 2 hours post start of each meal and with hypoglycemia.   -Glucose goals fasting 60-90 mg/dL and 2 hours post start of each meal 120 or less.  -Always have glucose available for hypoglycemia, use 15 by 15 rule.  -Continue follow up with OB and MFM as recommended.  -Serial fetal growth ultrasounds.  -22-24 weeks fetal echo.  -Starting at 32 weeks gestation, NST twice a week and ERNESTO weekly.   -Stay in close contact with diabetes team.

## 2024-07-18 NOTE — PROGRESS NOTES
Virtual Regular Visit  Name: Kimberlyn Wills      : 1995      MRN: [id number]  Encounter Provider: JR Schulte  Encounter Date: 2024   Encounter department: [facility]    Verification of patient location:    Patient is located at Home in the following state in which I hold an active license [place]    Assessment & Plan   1. Insulin controlled gestational diabetes mellitus (GDM) in second trimester  Assessment & Plan:  -Due to FBS >95, start insulin glargine 14 units at 9 PM daily. Set an alarm.  -Always have bedtime snack with at least 15 grams of carbohydrates with 21 grams or 3 ounces of protein.  -Send a message on Monday, 2024 for glucose readings to be reviewed.    -Continue GDM meal plan, eating every 2 to 3 hours while awake and no more than 8 to 10 hours during sleep hours.  -Minimum total daily carbohydrates 175 grams paired with half the grams in protein.   -Continue SMBG fasting, 2 hours post start of each meal and with hypoglycemia.   -Glucose goals fasting 60-90 mg/dL and 2 hours post start of each meal 120 or less.  -Always have glucose available for hypoglycemia, use 15 by 15 rule.  -Continue follow up with OB and MFM as recommended.  -Serial fetal growth ultrasounds.  -22-24 weeks fetal echo.  -Starting at 32 weeks gestation, NST twice a week and ERNESTO weekly.   -Stay in close contact with diabetes team.   Lab Results   Component Value Date    HGBA1C 5.0 2024     Orders:  -     Lantus SoloStar 100 units/mL SOPN; Inject 0.14 mL (14 Units total) under the skin daily at bedtime At 9 PM daily. To be titrated. GDM.  -     Insulin Pen Needle 31G X 5 MM MISC; Inject under the skin daily at bedtime Use one a day or as directed.  2. 21 weeks gestation of pregnancy  -     Lantus SoloStar 100 units/mL SOPN; Inject 0.14 mL (14 Units total) under the skin daily at bedtime At 9 PM daily. To be titrated. GDM.  -     Insulin Pen Needle 31G X 5 MM MISC; Inject under the  skin daily at bedtime Use one a day or as directed.  3. Obesity affecting pregnancy in second trimester, unspecified obesity type  Assessment & Plan:  -Pre-pregnancy weight 199 lbs. BMI 34.14.  -Current weight 197 lbs. BMI 34.14.   -Recommended weight gain 11 to 20 lbs.  -Continue GDM meal plan and follow with dietitian.   Orders:  -     Lantus SoloStar 100 units/mL SOPN; Inject 0.14 mL (14 Units total) under the skin daily at bedtime At 9 PM daily. To be titrated. GDM.  -     Insulin Pen Needle 31G X 5 MM MISC; Inject under the skin daily at bedtime Use one a day or as directed.  4. BMI 33.0-33.9,adult  -     Lantus SoloStar 100 units/mL SOPN; Inject 0.14 mL (14 Units total) under the skin daily at bedtime At 9 PM daily. To be titrated. GDM.  -     Insulin Pen Needle 31G X 5 MM MISC; Inject under the skin daily at bedtime Use one a day or as directed.  Insulin pen education without returned demonstration due to virtual visit.   Insulin administration times, insulin action.  Hypoglycemia signs, symptoms and treatment.  Increase in maternal-fetal surveillance with insulin initiation.  Side effects of hyperglycemia in pregnancy including macrosomia,  hypoglycemia, polyhydramnios, pre-term labor and stillbirth.          Encounter provider JR Schulte    The patient was identified by name and date of birth. Kimberlyn Wills was informed that this is a telemedicine visit and that the visit is being conducted through the Epic Embedded platform. She agrees to proceed..  My office door was closed. No one else was in the room.  She acknowledged consent and understanding of privacy and security of the video platform. The patient has agreed to participate and understands they can discontinue the visit at any time.    Patient is aware this is a billable service.     History of Present Illness     Kimberlyn Wills is a 28 y.o. female who presents for insulin start and insulin pen education. Currently 21 3/7 weeks  gestation. Following GDM meal plan. SMBG and reporting via flowsheet. Works night shift Sun-Tues. True fasting glucose readings in the 100's. Insulin pen demonstration and encouraged to watch online insulin pen education video. Quick insulin pen reference sheet sent via [employer].   Weight stable.   Review of Systems   Constitutional:  Negative for fatigue and fever.   HENT:  Negative for congestion.    Eyes:  Negative for visual disturbance.   Respiratory:  Negative for cough and shortness of breath.    Cardiovascular:  Negative for chest pain, palpitations and leg swelling.   Gastrointestinal:  Negative for constipation, nausea and vomiting.   Endocrine: Negative for polydipsia, polyphagia and polyuria.   Genitourinary:  Negative for difficulty urinating and vaginal bleeding.   Musculoskeletal:  Negative for back pain.   Skin:  Negative for rash.   Neurological:  Negative for headaches.   Psychiatric/Behavioral:  Negative for sleep disturbance.      Medical History Reviewed by provider this encounter:  Tobacco  Allergies  Meds  Problems  Med Hx  Surg Hx  Fam Hx       Past Medical History   Past Medical History:   Diagnosis Date    Asthma     Bipolar 2 disorder (HCC)     History of postpartum depression     Psychiatric disorder     depression and anxiety     Past Surgical History:   Procedure Laterality Date    DENTAL IMPLANT  2024    ORTHOPEDIC SURGERY  2020    right knee    WISDOM TOOTH EXTRACTION       Family History   Problem Relation Age of Onset    Hypothyroidism Mother     Depression Mother     No Known Problems Father     Depression Sister     Depression Brother      Current Outpatient Medications on File Prior to Visit   Medication Sig Dispense Refill    Blood Glucose Monitoring Suppl (OneTouch Verio Flex System) w/Device KIT Dispense 1 kit per insurance formulary. 1 kit 0    FLUoxetine (PROzac) 20 mg capsule Take 20 mg by mouth daily      OLANZapine (ZyPREXA) 5 mg tablet Take 7.5 mg by mouth       OneTouch Delica Lancets 33G MISC Use 4 a day or as directed. 100 each 5    OneTouch Verio test strip Test 4 times a day and as instructed. Gestational diabetes. 100 each 5    Prenatal Multivit-Min-Fe-FA (PRE-FRIDA PO) Take by mouth       No current facility-administered medications on file prior to visit.   No Known Allergies   Social History     Tobacco Use    Smoking status: Every Day     Types: Cigarettes    Smokeless tobacco: Not on file    Tobacco comments:     Currently 2 cigarettes per day     weaning   Vaping Use    Vaping status: Former    Substances: Nicotine, Flavoring   Substance and Sexual Activity    Alcohol use: Not Currently     Comment: pregnant    Drug use: Yes     Types: Marijuana     Comment: has medical marijuana use. has been using sporadically in pregnancy. will review with MFM per patient    Sexual activity: Yes     Partners: Male     Comment: pregnant     Objective     LMP 2024   Physical Exam  HENT:      Head: Normocephalic.      Nose: Nose normal.   Eyes:      Conjunctiva/sclera: Conjunctivae normal.   Pulmonary:      Effort: Pulmonary effort is normal.   Neurological:      Mental Status: She is alert and oriented to person, place, and time.       Visit Time  Total Visit Duration: 47 minutes with patient.

## 2024-08-01 ENCOUNTER — ROUTINE PRENATAL (OUTPATIENT)
Dept: PERINATAL CARE | Facility: OTHER | Age: 29
End: 2024-08-01
Payer: COMMERCIAL

## 2024-08-01 VITALS
DIASTOLIC BLOOD PRESSURE: 64 MMHG | WEIGHT: 200.4 LBS | HEART RATE: 99 BPM | SYSTOLIC BLOOD PRESSURE: 116 MMHG | BODY MASS INDEX: 34.21 KG/M2 | HEIGHT: 64 IN

## 2024-08-01 DIAGNOSIS — Z3A.23 23 WEEKS GESTATION OF PREGNANCY: Primary | ICD-10-CM

## 2024-08-01 DIAGNOSIS — O99.342 ANXIETY DURING PREGNANCY, ANTEPARTUM, SECOND TRIMESTER: ICD-10-CM

## 2024-08-01 DIAGNOSIS — F41.9 ANXIETY DURING PREGNANCY, ANTEPARTUM, SECOND TRIMESTER: ICD-10-CM

## 2024-08-01 DIAGNOSIS — O99.212 MATERNAL OBESITY, ANTEPARTUM, SECOND TRIMESTER: ICD-10-CM

## 2024-08-01 DIAGNOSIS — O09.292 HISTORY OF INTRAUTERINE GROWTH RESTRICTION IN PRIOR PREGNANCY, CURRENTLY PREGNANT, SECOND TRIMESTER: ICD-10-CM

## 2024-08-01 DIAGNOSIS — O24.414 INSULIN CONTROLLED GESTATIONAL DIABETES MELLITUS (GDM) IN SECOND TRIMESTER: ICD-10-CM

## 2024-08-01 PROCEDURE — 76825 ECHO EXAM OF FETAL HEART: CPT | Performed by: OBSTETRICS & GYNECOLOGY

## 2024-08-01 PROCEDURE — 76827 ECHO EXAM OF FETAL HEART: CPT | Performed by: OBSTETRICS & GYNECOLOGY

## 2024-08-01 PROCEDURE — 93325 DOPPLER ECHO COLOR FLOW MAPG: CPT | Performed by: OBSTETRICS & GYNECOLOGY

## 2024-08-01 NOTE — LETTER
August 1, 2024     Reshma Samson MD  [address] [address]    Patient: Kimberlyn Wills   YOB: 1995   Date of Visit: 8/1/2024       Dear Dr. Samson:    Thank you for referring Kimberlyn Wills to me for evaluation. Below are my notes for this consultation.    If you have questions, please do not hesitate to call me. I look forward to following your patient along with you.         Sincerely,        Jimmy Ivy MD        CC: No Recipients    Jimmy Ivy MD  8/1/2024  3:02 PM  Sign when Signing Visit  Please refer to the [facility] ultrasound report in Ob Procedures for additional information regarding today's visit

## 2024-08-01 NOTE — PROGRESS NOTES
Please refer to the [facility] ultrasound report in Ob Procedures for additional information regarding today's visit

## 2024-08-12 ENCOUNTER — TREATMENT (OUTPATIENT)
Dept: PERINATAL CARE | Facility: CLINIC | Age: 29
End: 2024-08-12

## 2024-08-12 DIAGNOSIS — O24.414 INSULIN CONTROLLED GESTATIONAL DIABETES MELLITUS (GDM) IN SECOND TRIMESTER: Primary | ICD-10-CM

## 2024-08-12 DIAGNOSIS — Z79.4 INSULIN DOSE CHANGED (HCC): ICD-10-CM

## 2024-08-12 DIAGNOSIS — Z3A.25 25 WEEKS GESTATION OF PREGNANCY: ICD-10-CM

## 2024-08-12 NOTE — PATIENT INSTRUCTIONS
MEDICATIONS:     Due to recent fasting blood sugars >90,   Increase Lantus from 14 to 16 units once daily at bedtime (9pm)  Send a message to Rosey Gonzalez on Thursday (8/15) if fasting blood sugars remain >90 to notify the diabetes team to review to determine if an additional insulin adjustment is needed.    DIET:   Continue Assigned Meal Plan (including 3 meals and 3 snacks): 2000 calorie (CHO: 45-15-60-15-60-30) (PRO: 2/3-1-3/4-1-3/4-2)    BLOOD SUGAR MONITORING: (Glucometer: OneTouch Verio Flex)  Continue Testing B x per day (Fasting, 2 hour after start of each meal)    PHYSICAL ACTIVITY:  Continue walking (or other preferred physical activity) daily - recommend at least 20-30 minutes daily, preferably after dinner if able (unless otherwise instructed per OB)

## 2024-08-12 NOTE — PROGRESS NOTES
"Blood Sugar Log  Method of Reporting: Be At One Glucose Flowsheet   Date: 24    Patient: Kimberlyn Wills  : 1995    Estimated Date of Delivery: 24  GA: 25w0d     Reason for Documentation: Blood Sugar Log (24 - 24), Gestational Diabetes (25w0d  Insulin-controlled), and Medication Dose Change (Lantus)     ASSESSMENT - REVIEW OF BG LOG     BG Log:        Patient Comments: \"I was on vacation from - and did the best I could with my food choices. My sugars were pretty high, but I think it was diet. I was home for dinner on  and my after dinner blood sugar was back to my average.\"    Assessment:  FBG: Not Well Controlled; Consistently Elevated (>90mg/dl)   PPBG: Few Elevated (>120mg/dl) - Not Consistently Elevated; Was on vacation this week    PLAN     MEDICATIONS:     Due to recent fasting blood sugars >90,   Increase Lantus from 14 to 16 units once daily at bedtime (9pm)  Send a message to Rosey Gonzalez on Thursday (8/15) if fasting blood sugars remain >90 to notify the diabetes team to review to determine if an additional insulin adjustment is needed.    DIET:   Continue Assigned Meal Plan (including 3 meals and 3 snacks): 2000 calorie (CHO: 45-15-60-15-60-30) (PRO: 2/3-1-3/4-1-3/4-2)    BLOOD SUGAR MONITORING: (Glucometer: OneTouch Verio Flex)  Continue Testing B x per day (Fasting, 2 hour after start of each meal)    PHYSICAL ACTIVITY:  Continue walking (or other preferred physical activity) daily - recommend at least 20-30 minutes daily, preferably after dinner if able (unless otherwise instructed per OB)     MONITORING     EDUCATION: (Diabetes and Pregnancy Program)    Completed: Class 1 (Pt Goal: \"I will eat 3 meals and 3 snacks each day, including protein at each\"), Class 2, and Insulin Education    Needs Scheduled: None at this time, Follow-ups to be scheduled as indicated per weekly review of blood sugar logs    WEIGHT:     Pre-Gravid Wt Pre-Gravid BMI TWG   90.3 kg (199 lb) " 34.14 0.635 kg (1 lb 6.4 oz)     FETAL MONITORITNG - ULTRASOUNDS  Recent Ultrasounds Findings: NML Growth/ERNESTO  US Follow-Ups: Scheduled Appropriately, 9/6/24  Further Fetal Surveillance: Beginning at 32 weeks (NST twice weekly + ERNESTO once a week)    LABS  Lab Results   Component Value Date/Time    [id number] 187 (H) 06/06/2024 11:42 AM    GLUF 193 (H) 06/06/2024 11:42 AM    HGBA1C 5.0 07/12/2024 04:41 PM    HGBA1C 5.3 03/13/2024 10:49 AM     Active Orders (needing to be collected):  None    Cinthia Lorenzo RD   Diabetes and Pregnancy Program   Maternal Fetal Medicine  [facility]

## 2024-08-12 NOTE — PROGRESS NOTES
OB/GYN  PN Visit  Kimberlyn Wills  [id number]  8/15/2024  10:18 AM  JR Reaves    S: 28 y.o.  25w3d here for PN visit. Pregnancy complicated by tobacco use, A2GDM, and BMI 34. History of partial placenta abruption in 2018 and IUGR.    OB complaints:  Denies c/o n/v/ha, no edema, no smoking, no DV.   No vb/lof  No cramping/ctxns or signs of PTL.    She reports feeling good; she feels good FM.    O:    Pre-Trisha Vitals      Flowsheet Row Most Recent Value   Prenatal Assessment    Fetal Heart Rate 145   Fundal Height (cm) 25 cm   Movement Present   Prenatal Vitals    Blood Pressure 120/70   Weight - Scale 93 kg (205 lb)   Urine Albumin/Glucose    Dilation/Effacement/Station    Vaginal Drainage    Edema               Gen: no acute distress, nonlabored breathing.  OB exam completed: fundal height, +FHT.  Urine: -/-    A/P:  Problem List Items Addressed This Visit       Insulin controlled gestational diabetes mellitus (GDM) in second trimester    Obesity complicating pregnancy in second trimester    BMI 33.0-33.9,adult     Other Visit Diagnoses       Prenatal care in second trimester    -  Primary    Relevant Orders    RPR-Syphilis Screening (Total Syphilis IGG/IGM)    CBC and differential    Anemia Panel w/Reflex, OB          #1. 25w3d GESTATION  Labor precautions reviewed  Fetal movement reviewed  Labs UTD. Third tri labs given today.    Breast feeding: yes, pump ordered today  Pediatrician: established  Contraception: POP to MILLY  GBS: at 36 weeks    RTC in 4 weeks    JR Reaves  8/15/2024  10:18 AM

## 2024-08-15 ENCOUNTER — ROUTINE PRENATAL (OUTPATIENT)
Dept: OBGYN CLINIC | Facility: CLINIC | Age: 29
End: 2024-08-15

## 2024-08-15 VITALS
WEIGHT: 205 LBS | DIASTOLIC BLOOD PRESSURE: 70 MMHG | BODY MASS INDEX: 35 KG/M2 | SYSTOLIC BLOOD PRESSURE: 120 MMHG | HEIGHT: 64 IN

## 2024-08-15 DIAGNOSIS — O99.212 OBESITY AFFECTING PREGNANCY IN SECOND TRIMESTER, UNSPECIFIED OBESITY TYPE: ICD-10-CM

## 2024-08-15 DIAGNOSIS — O24.414 INSULIN CONTROLLED GESTATIONAL DIABETES MELLITUS (GDM) IN SECOND TRIMESTER: ICD-10-CM

## 2024-08-15 DIAGNOSIS — Z34.92 PRENATAL CARE IN SECOND TRIMESTER: Primary | ICD-10-CM

## 2024-08-15 LAB
DME PARACHUTE DELIVERY DATE REQUESTED: NORMAL
DME PARACHUTE ITEM DESCRIPTION: NORMAL
DME PARACHUTE ORDER STATUS: NORMAL
DME PARACHUTE SUPPLIER NAME: NORMAL
DME PARACHUTE SUPPLIER PHONE: NORMAL

## 2024-08-15 PROCEDURE — PNV

## 2024-08-16 ENCOUNTER — TELEPHONE (OUTPATIENT)
Dept: OBGYN CLINIC | Facility: CLINIC | Age: 29
End: 2024-08-16

## 2024-08-16 NOTE — TELEPHONE ENCOUNTER
Overall how are you doing?     Compliant with routine OB care appointments? yes    Have you completed your 1st trimester labs? yes    If you had NIPS with MFM, do you have a order for MSAFP? Completed, WNL   Can be completed 15w-22w9d, ideally 16w-18w    Have you seen MFM and do you have your detailed US scheduled? Completed, growth 9/6/24    Pregnancy Education-have you had a chance to review the classes offered and registered?     EPDS Score      Placed call to patient, left a non detailed message to return our call.

## 2024-08-20 NOTE — PATIENT INSTRUCTIONS
-A1c 5% at goal.   -A1c goal is 5.6% or less.  -Continue Lantus 16 units daily.   -FBS improved with starting Lantus.   -Follow up with dietitian as needed.   -Self monitoring blood glucose (SMBG) fasting; 2 hours after start of each meal and with hypoglycemia.   -Glucose goals: fasting 60-90 mg/dL, 140 mg/dL or less 1 hour post meals, and 120 mg/dL or less 2 hours post meal.   -Report glucose readings weekly via [employer] every Thursday.   -GDM 1800 meal plan with 3 meals and 3 snacks including recommended combination of carb, protein and fat per meal/snack.  -Please eat meal or snack every 2-3.5 hours while awake.  -No more than 8 to 10 hours of fasting overnight.  -Refer to [employer] MyPlate online as a reference.  -2nd/3rd trimester minimum total daily carbohydrates 175 grams paired with half grams in protein.   -Stay active if no restriction from your OB, walk up to 30 minutes a day.  -Always have glucose available to treat hypoglycemia. Use 15:15 rule.   -Refer to hypoglycemia patient education sheet. SMBG when experiencing signs and symptoms of hypoglycemia and prior to driving.   -Serial fetal growth ultrasounds.  -20 weeks detailed fetal growth ultrasound 2024.  -22-24 weeks fetal echo .  -At 32 weeks gestation; NST twice a week and ERNESTO weekly.   -Continue prenatal vitamin as recommended.  -Continue follow-up with your OB and MFM as recommended.  -Stay in close contact with diabetes education team.  -4 to 12 weeks postpartum complete 2-hour glucose tolerance test for diabetes screening.  -Postpartum continue with a healthy lifestyle to decrease risk of developing type 2 diabetes.   Thank you for choosing us for your  care today.  If you have any questions about your ultrasound or care, please do not hesitate to contact us or your primary obstetrician.        Some general instructions for your pregnancy are:    Exercise: Aim for 150 minutes per week of regular exercise.  Walking is  great!  Nutrition: Choose healthy sources of calcium, iron, and protein.  Avoid ultraprocessed foods and added sugar.  Learn about Preeclampsia: preeclampsia is a common, potentially serious high blood pressure complication in pregnancy.  A blood pressure of 140mmHg (systolic or top number) or 90mmHg (diastolic or bottom number) should be evaluated by your doctor.  Aspirin is sometimes prescribed in early pregnancy to prevent preeclampsia in women with risk factors - ask your obstetrician if you should be on this medication.  For more resources, visit:  https://[email or web address].org/preeclampsia  If you smoke, please try to quit completely but also try to reduce your smoking by as much as possible (as soon as possible).  Do not vape.  Please also avoid cannabis products.  Other warning signs to watch out for in pregnancy or postpartum: chest pain, obstructed breathing or shortness of breath, seizures, thoughts of hurting yourself or your baby, bleeding, a painful or swollen leg, fever, or headache (see [facility] POST-BIRTH Warning Signs campaign).  If these happen call 911.  Itching is also not normal in pregnancy and if you experience this, especially over your hands and feet, potentially worse at night, notify your doctors.

## 2024-08-22 ENCOUNTER — TELEMEDICINE (OUTPATIENT)
Facility: HOSPITAL | Age: 29
End: 2024-08-22
Payer: COMMERCIAL

## 2024-08-22 VITALS — HEIGHT: 64 IN | WEIGHT: 205 LBS | BODY MASS INDEX: 35 KG/M2

## 2024-08-22 DIAGNOSIS — O99.212 OBESITY AFFECTING PREGNANCY IN SECOND TRIMESTER, UNSPECIFIED OBESITY TYPE: ICD-10-CM

## 2024-08-22 DIAGNOSIS — O24.414 INSULIN CONTROLLED GESTATIONAL DIABETES MELLITUS (GDM) IN SECOND TRIMESTER: Primary | ICD-10-CM

## 2024-08-22 DIAGNOSIS — Z3A.26 26 WEEKS GESTATION OF PREGNANCY: ICD-10-CM

## 2024-08-22 DIAGNOSIS — Z3A.21 21 WEEKS GESTATION OF PREGNANCY: ICD-10-CM

## 2024-08-22 PROCEDURE — 99213 OFFICE O/P EST LOW 20 MIN: CPT | Performed by: NURSE PRACTITIONER

## 2024-08-22 RX ORDER — POLYETHYLENE GLYCOL 3350 17 G
POWDER IN PACKET (EA) ORAL
COMMUNITY
Start: 2024-07-19

## 2024-08-22 NOTE — PROGRESS NOTES
Virtual Regular Visit  Name: Kimberlyn Wills      : 1995      MRN: [id number]  Encounter Provider: JR Schulte  Encounter Date: 2024   Encounter department: [facility]    Verification of patient location:    Patient is located at Home in the following state in which I hold an active license [place]    Assessment & Plan   1. Insulin controlled gestational diabetes mellitus (GDM) in second trimester  Assessment & Plan:  -A1c 5% at goal.   -A1c goal is 5.6% or less.  -Continue Lantus 16 units daily.   -FBS improved with starting Lantus.   -Follow up with dietitian as needed.   -Self monitoring blood glucose (SMBG) fasting; 2 hours after start of each meal and with hypoglycemia.   -Glucose goals: fasting 60-90 mg/dL, 140 mg/dL or less 1 hour post meals, and 120 mg/dL or less 2 hours post meal.   -Report glucose readings weekly via [employer] every Thursday.   -GDM 1800 meal plan with 3 meals and 3 snacks including recommended combination of carb, protein and fat per meal/snack.  -Please eat meal or snack every 2-3.5 hours while awake.  -No more than 8 to 10 hours of fasting overnight.  -Refer to [employer] MyPlate online as a reference.  -2nd/3rd trimester minimum total daily carbohydrates 175 grams paired with half grams in protein.   -Stay active if no restriction from your OB, walk up to 30 minutes a day.  -Always have glucose available to treat hypoglycemia. Use 15:15 rule.   -Refer to hypoglycemia patient education sheet. SMBG when experiencing signs and symptoms of hypoglycemia and prior to driving.   -Serial fetal growth ultrasounds.  -20 weeks detailed fetal growth ultrasound 2024.  -22-24 weeks fetal echo .  -At 32 weeks gestation; NST twice a week and ERNESTO weekly.   -Continue prenatal vitamin as recommended.  -At 36 weeks gestation, stop baby aspirin.   -Continue follow-up with your OB and MFM as recommended.  -Stay in close contact with diabetes education  team.  -4 to 12 weeks postpartum complete 2-hour glucose tolerance test for diabetes screening.  -Postpartum continue with a healthy lifestyle to decrease risk of developing type 2 diabetes.   Lab Results   Component Value Date    HGBA1C 5.0 2024     2. 26 weeks gestation of pregnancy  3. Obesity affecting pregnancy in second trimester, unspecified obesity type  Assessment & Plan:  -Pre-pregnancy weight 199 lbs. BMI 34.14.  -Current weight 205 lbs. BMI 35.19.   -Recommended weight gain 11 to 20 lbs.  -Continue GDM meal plan and follow with dietitian as needed.   4. BMI 35.0-35.9,adult  5. 21 weeks gestation of pregnancy  6. BMI 33.0-33.9,adult        Encounter provider JR Schulte    The patient was identified by name and date of birth. Kimberlyn Wills was informed that this is a telemedicine visit and that the visit is being conducted through the Epic Embedded platform. She agrees to proceed..  My office door was closed. No one else was in the room.  She acknowledged consent and understanding of privacy and security of the video platform. The patient has agreed to participate and understands they can discontinue the visit at any time.    Patient is aware this is a billable service.     History of Present Illness     Kimberlyn Wills is a 28 y.o. female  26 3/7 weeks gestation GDM on Lantus 16 units. Eating 3 meals and 3 snacks a day. Testing fasting and 2 hours post start of each meal and reporting via flowsheet. FBS readings have improved with starting Lantus. No regular exercise. Positive fetal movement. Weight stable, up to date weight gain 6 lbs. Smoker- down to 2 cigs a day and plans to stop and switch to nicotine patch.   Works from home.     Review of Systems   Constitutional:  Negative for fatigue and fever.   HENT:  Positive for congestion. Negative for trouble swallowing.    Eyes:  Negative for visual disturbance.   Respiratory:  Negative for cough and shortness of breath.    Cardiovascular:   Negative for chest pain, palpitations and leg swelling.   Gastrointestinal:  Negative for constipation, nausea and vomiting.   Endocrine: Negative for polydipsia, polyphagia and polyuria.   Genitourinary:  Negative for difficulty urinating and vaginal bleeding.   Musculoskeletal:  Negative for back pain.   Skin:  Negative for rash.   Neurological:  Negative for headaches.   Psychiatric/Behavioral:  Negative for sleep disturbance.      Medical History Reviewed by provider this encounter:  Tobacco  Allergies  Meds  Problems  Med Hx  Surg Hx  Fam Hx  Soc   Hx      Current Outpatient Medications on File Prior to Visit   Medication Sig Dispense Refill    Blood Glucose Monitoring Suppl (OneTouch Verio Flex System) w/Device KIT Dispense 1 kit per insurance formulary. 1 kit 0    FLUoxetine (PROzac) 20 mg capsule Take 20 mg by mouth daily      Insulin Pen Needle 31G X 5 MM MISC Inject under the skin daily at bedtime Use one a day or as directed. (Patient taking differently: Inject 1 each under the skin daily at bedtime Use one a day or as directed.) 100 each 1    Lantus SoloStar 100 units/mL SOPN Inject 0.14 mL (14 Units total) under the skin daily at bedtime At 9 PM daily. To be titrated. GDM. (Patient taking differently: Inject 16 Units under the skin daily at bedtime At 9 PM daily. To be titrated. GDM.) 15 mL 0    OLANZapine (ZyPREXA) 5 mg tablet Take 7.5 mg by mouth      OneTouch Delica Lancets 33G MISC Use 4 a day or as directed. 100 each 5    OneTouch Verio test strip Test 4 times a day and as instructed. Gestational diabetes. 100 each 5    Prenatal Multivit-Min-Fe-FA (PRE- PO) Take by mouth      nicotine (NICODERM CQ) 7 mg/24hr TD 24 hr patch  (Patient not taking: Reported on 2024)      nicotine polacrilex (COMMIT) 2 MG lozenge  (Patient not taking: Reported on 2024)       No current facility-administered medications on file prior to visit.      Social History     Tobacco Use    Smoking status:  "Every Day     Types: Cigarettes    Smokeless tobacco: Not on file    Tobacco comments:     Currently 2 cigarettes per day     Weaning- working on stopping completely and start nicotine patch   Vaping Use    Vaping status: Former    Substances: Nicotine, Flavoring   Substance and Sexual Activity    Alcohol use: Not Currently     Comment: pregnant    Drug use: Yes     Types: Marijuana     Comment: has medical marijuana use. has been using sporadically in pregnancy. will review with MFM per patient    Sexual activity: Yes     Partners: Male     Comment: pregnant     Objective     Ht 5' 4\" (1.626 m)   Wt 93 kg (205 lb)   LMP 02/19/2024   BMI 35.19 kg/m²   Refer to glucose flowsheet FBS 90 or less and 2 hours postprandial readings 120 or less. Improvement in FBS readings noted with starting basal insulin. Weekly review of glucose readings.   Physical Exam  HENT:      Head: Normocephalic.      Nose: Nose normal.   Eyes:      Conjunctiva/sclera: Conjunctivae normal.   Pulmonary:      Effort: Pulmonary effort is normal.   Neurological:      Mental Status: She is alert and oriented to person, place, and time.   Psychiatric:         Mood and Affect: Mood normal.         Behavior: Behavior normal.         Thought Content: Thought content normal.         Judgment: Judgment normal.         Visit Time  Total Visit Duration: 17 minutes with patient and 10 minutes charting.         "

## 2024-08-22 NOTE — ASSESSMENT & PLAN NOTE
-Pre-pregnancy weight 199 lbs. BMI 34.14.  -Current weight 205 lbs. BMI 35.19.   -Recommended weight gain 11 to 20 lbs.  -Continue GDM meal plan and follow with dietitian as needed.

## 2024-08-22 NOTE — ASSESSMENT & PLAN NOTE
-A1c 5% at goal.   -A1c goal is 5.6% or less.  -Continue Lantus 16 units daily.   -FBS improved with starting Lantus.   -Follow up with dietitian as needed.   -Self monitoring blood glucose (SMBG) fasting; 2 hours after start of each meal and with hypoglycemia.   -Glucose goals: fasting 60-90 mg/dL, 140 mg/dL or less 1 hour post meals, and 120 mg/dL or less 2 hours post meal.   -Report glucose readings weekly via [employer] every Thursday.   -GDM 1800 meal plan with 3 meals and 3 snacks including recommended combination of carb, protein and fat per meal/snack.  -Please eat meal or snack every 2-3.5 hours while awake.  -No more than 8 to 10 hours of fasting overnight.  -Refer to [employer] MyPlate online as a reference.  -2nd/3rd trimester minimum total daily carbohydrates 175 grams paired with half grams in protein.   -Stay active if no restriction from your OB, walk up to 30 minutes a day.  -Always have glucose available to treat hypoglycemia. Use 15:15 rule.   -Refer to hypoglycemia patient education sheet. SMBG when experiencing signs and symptoms of hypoglycemia and prior to driving.   -Serial fetal growth ultrasounds.  -20 weeks detailed fetal growth ultrasound 7/12/2024.  -22-24 weeks fetal echo 81/2024.  -At 32 weeks gestation; NST twice a week and ERNESTO weekly.   -Continue prenatal vitamin as recommended.  -Continue follow-up with your OB and MFM as recommended.  -Stay in close contact with diabetes education team.  -4 to 12 weeks postpartum complete 2-hour glucose tolerance test for diabetes screening.  -Postpartum continue with a healthy lifestyle to decrease risk of developing type 2 diabetes.   Lab Results   Component Value Date    HGBA1C 5.0 07/12/2024

## 2024-08-30 LAB
DME PARACHUTE DELIVERY DATE ACTUAL: NORMAL
DME PARACHUTE DELIVERY DATE REQUESTED: NORMAL
DME PARACHUTE ITEM DESCRIPTION: NORMAL
DME PARACHUTE ORDER STATUS: NORMAL
DME PARACHUTE SUPPLIER NAME: NORMAL
DME PARACHUTE SUPPLIER PHONE: NORMAL

## 2024-09-03 NOTE — PROGRESS NOTES
OB/GYN  PN Visit  Kimberlyn Wills  [id number]  2024  12:42 PM  JR Reaves    S: 28 y.o.  28w3d here for PN visit. Pregnancy complicated by tobacco use, A2GDM, and BMI 34. History of partial placenta abruption in 2018 and IUGR.    OB complaints:  Denies c/o n/v/ha, no edema, no DV.   No vb/lof  No cramping/ctxns or signs of PTL.    She reports feeling good; she feels good FM. She smokes but actively working to quit- is down a significant amount!     O:    Pre- Vitals      Flowsheet Row Most Recent Value   Prenatal Assessment    Prenatal Vitals    Blood Pressure 112/74   Weight - Scale 94.3 kg (208 lb)   Urine Albumin/Glucose    Dilation/Effacement/Station    Vaginal Drainage    Edema                 Gen: no acute distress, nonlabored breathing.  OB exam completed: fundal height, +FHT.  Urine: -/-    A/P:  Problem List Items Addressed This Visit       Insulin controlled gestational diabetes mellitus (GDM) in second trimester    26 weeks gestation of pregnancy    Obesity complicating pregnancy in second trimester    BMI 35.0-35.9,adult     Other Visit Diagnoses       Prenatal care in third trimester    -  Primary    Encounter for immunization        Relevant Orders    Tdap Vaccine greater than or equal to 6yo          #1. 28w3d GESTATION  Labor precautions reviewed  Fetal movement reviewed  Labs UTD.   Breast feeding: yes, pump ordered today  Pediatrician: established  Contraception: POP to MILLY  Tdap: given today  GBS: at 36 weeks    F/U growth scan tomorrow.     RTC in 4 weeks    JR Reaves  2024  12:42 PM

## 2024-09-05 ENCOUNTER — ROUTINE PRENATAL (OUTPATIENT)
Dept: OBGYN CLINIC | Facility: CLINIC | Age: 29
End: 2024-09-05
Payer: COMMERCIAL

## 2024-09-05 VITALS
SYSTOLIC BLOOD PRESSURE: 112 MMHG | BODY MASS INDEX: 35.51 KG/M2 | HEIGHT: 64 IN | DIASTOLIC BLOOD PRESSURE: 74 MMHG | WEIGHT: 208 LBS

## 2024-09-05 DIAGNOSIS — Z34.93 PRENATAL CARE IN THIRD TRIMESTER: Primary | ICD-10-CM

## 2024-09-05 DIAGNOSIS — O24.414 INSULIN CONTROLLED GESTATIONAL DIABETES MELLITUS (GDM) IN SECOND TRIMESTER: ICD-10-CM

## 2024-09-05 DIAGNOSIS — Z23 ENCOUNTER FOR IMMUNIZATION: ICD-10-CM

## 2024-09-05 DIAGNOSIS — O99.212 OBESITY AFFECTING PREGNANCY IN SECOND TRIMESTER, UNSPECIFIED OBESITY TYPE: ICD-10-CM

## 2024-09-05 PROCEDURE — PNV

## 2024-09-05 PROCEDURE — 90471 IMMUNIZATION ADMIN: CPT

## 2024-09-05 PROCEDURE — 90715 TDAP VACCINE 7 YRS/> IM: CPT

## 2024-09-05 NOTE — PROGRESS NOTES
Tdap given in right deltoid due to pt having tattoo on left deltoid. Pt tolerated well. No previous history of reaction to tdap or any other immunization pt tolerated well. VIS given at time of administration.

## 2024-09-06 ENCOUNTER — APPOINTMENT (OUTPATIENT)
Dept: LAB | Facility: CLINIC | Age: 29
End: 2024-09-06
Payer: COMMERCIAL

## 2024-09-06 ENCOUNTER — ULTRASOUND (OUTPATIENT)
Dept: PERINATAL CARE | Facility: CLINIC | Age: 29
End: 2024-09-06
Payer: COMMERCIAL

## 2024-09-06 ENCOUNTER — DOCUMENTATION (OUTPATIENT)
Facility: HOSPITAL | Age: 29
End: 2024-09-06

## 2024-09-06 VITALS
WEIGHT: 208.6 LBS | BODY MASS INDEX: 35.61 KG/M2 | SYSTOLIC BLOOD PRESSURE: 128 MMHG | DIASTOLIC BLOOD PRESSURE: 62 MMHG | HEART RATE: 101 BPM | HEIGHT: 64 IN

## 2024-09-06 DIAGNOSIS — Z3A.18 18 WEEKS GESTATION OF PREGNANCY: ICD-10-CM

## 2024-09-06 DIAGNOSIS — Z34.92 PRENATAL CARE IN SECOND TRIMESTER: ICD-10-CM

## 2024-09-06 DIAGNOSIS — O24.414 INSULIN CONTROLLED GESTATIONAL DIABETES MELLITUS (GDM) IN SECOND TRIMESTER: ICD-10-CM

## 2024-09-06 DIAGNOSIS — Z3A.28 28 WEEKS GESTATION OF PREGNANCY: Primary | ICD-10-CM

## 2024-09-06 DIAGNOSIS — O24.410 DIET CONTROLLED GESTATIONAL DIABETES MELLITUS (GDM) IN SECOND TRIMESTER: ICD-10-CM

## 2024-09-06 LAB
BASOPHILS # BLD AUTO: 0.02 THOUSANDS/ÂΜL (ref 0–0.1)
BASOPHILS NFR BLD AUTO: 0 % (ref 0–1)
EOSINOPHIL # BLD AUTO: 0.09 THOUSAND/ÂΜL (ref 0–0.61)
EOSINOPHIL NFR BLD AUTO: 1 % (ref 0–6)
ERYTHROCYTE [DISTWIDTH] IN BLOOD BY AUTOMATED COUNT: 12.5 % (ref 11.6–15.1)
HCT VFR BLD AUTO: 35.7 % (ref 34.8–46.1)
HGB BLD-MCNC: 12.1 G/DL (ref 11.5–15.4)
IMM GRANULOCYTES # BLD AUTO: 0.09 THOUSAND/UL (ref 0–0.2)
IMM GRANULOCYTES NFR BLD AUTO: 1 % (ref 0–2)
LYMPHOCYTES # BLD AUTO: 1.81 THOUSANDS/ÂΜL (ref 0.6–4.47)
LYMPHOCYTES NFR BLD AUTO: 21 % (ref 14–44)
MCH RBC QN AUTO: 31.8 PG (ref 26.8–34.3)
MCHC RBC AUTO-ENTMCNC: 33.9 G/DL (ref 31.4–37.4)
MCV RBC AUTO: 94 FL (ref 82–98)
MONOCYTES # BLD AUTO: 0.6 THOUSAND/ÂΜL (ref 0.17–1.22)
MONOCYTES NFR BLD AUTO: 7 % (ref 4–12)
NEUTROPHILS # BLD AUTO: 6.24 THOUSANDS/ÂΜL (ref 1.85–7.62)
NEUTS SEG NFR BLD AUTO: 70 % (ref 43–75)
NRBC BLD AUTO-RTO: 0 /100 WBCS
PLATELET # BLD AUTO: 272 THOUSANDS/UL (ref 149–390)
PMV BLD AUTO: 10.2 FL (ref 8.9–12.7)
RBC # BLD AUTO: 3.81 MILLION/UL (ref 3.81–5.12)
TREPONEMA PALLIDUM IGG+IGM AB [PRESENCE] IN SERUM OR PLASMA BY IMMUNOASSAY: NORMAL
WBC # BLD AUTO: 8.85 THOUSAND/UL (ref 4.31–10.16)

## 2024-09-06 PROCEDURE — 99213 OFFICE O/P EST LOW 20 MIN: CPT | Performed by: OBSTETRICS & GYNECOLOGY

## 2024-09-06 PROCEDURE — 76816 OB US FOLLOW-UP PER FETUS: CPT | Performed by: OBSTETRICS & GYNECOLOGY

## 2024-09-06 PROCEDURE — 85025 COMPLETE CBC W/AUTO DIFF WBC: CPT

## 2024-09-06 PROCEDURE — 86780 TREPONEMA PALLIDUM: CPT

## 2024-09-06 PROCEDURE — 36415 COLL VENOUS BLD VENIPUNCTURE: CPT

## 2024-09-06 NOTE — LETTER
September 6, 2024     Luana HEDY Eda, JR  [address] [facility] [address]    Patient: Kimberlyn Wills   YOB: 1995   Date of Visit: 9/6/2024       Dear Ms. Veras:    Thank you for referring Kimberlyn Wills to me for evaluation. Below are my notes for this consultation.    If you have questions, please do not hesitate to call me. I look forward to following your patient along with you.         Sincerely,        Azael Lora MD        CC: No Recipients    Azael Lora MD  9/6/2024  4:24 PM  Sign when Signing Visit  A fetal ultrasound was completed. See Ob procedures in Epic for an interpretation and recommendations. Do not hesitate to contact us in [facility] if you have questions.    Azael Lora MD, MSCE  Maternal Fetal Medicine

## 2024-09-06 NOTE — PROGRESS NOTES
"Blood Sugar Log  Method of Reporting: [employer] Glucose Flowsheet   Date: 24    Patient: Kimberlyn Wills  : 1995    Estimated Date of Delivery: 24  GA: 28w4d     Reason for Documentation: Gestational Diabetes (28w4d) and Medication Management (Lantus/)     ASSESSMENT - REVIEW OF BG LOG     BG Log:        Assessment:  FBG:  Overall well controlled; 1 of 7 values elevated  PPBG: Overall controlled, 1 elevation noted    PLAN     MEDICATIONS:     Due to overall fasting blood sugars <90,   Continue Lantus 16 units once daily at bedtime (9pm)  Continue to report blood sugars weekly () or send a message to Rosey Gonzalez if fasting blood sugars consistently elevated  >90 to notify the diabetes team to review to determine if an additional insulin adjustment is needed.    DIET:   Continue Assigned Meal Plan (including 3 meals and 3 snacks): 2000 calorie (CHO: 45-15-60-15-60-30) (PRO: 2/3-1-3/4-1-3/4-2)    BLOOD SUGAR MONITORING: (Glucometer: OneTouch Verio Flex)  Continue Testing B x per day (Fasting, 2 hour after start of each meal)    PHYSICAL ACTIVITY:  Continue walking (or other preferred physical activity) daily - recommend at least 20-30 minutes daily, preferably after dinner if able (unless otherwise instructed per OB)     MONITORING     EDUCATION: (Diabetes and Pregnancy Program)    Completed: Class 1 (Pt Goal: \"I will eat 3 meals and 3 snacks each day, including protein at each\"), Class 2, and Insulin Education    Needs Scheduled: None at this time, Follow-ups to be scheduled as indicated per weekly review of blood sugar logs    WEIGHT:     Pre-Gravid Wt Pre-Gravid BMI TWG   90.3 kg (199 lb) 34.14 4.355 kg (9 lb 9.6 oz)     FETAL MONITORITNG - ULTRASOUNDS  Recent Ultrasounds Findings: NML Growth/ERNESTO - 24  US Follow-Ups: Scheduled Appropriately, 24  Further Fetal Surveillance: Beginning at 32 weeks (NST twice weekly + ERNESTO once a week)    LABS  Lab Results   Component Value Date/Time    " [id number] 187 (H) 06/06/2024 11:42 AM    GLUF 193 (H) 06/06/2024 11:42 AM    HGBA1C 5.0 07/12/2024 04:41 PM    HGBA1C 5.3 03/13/2024 10:49 AM     Active Orders (needing to be collected):  None    Mandi Barakat   Diabetes and Pregnancy Program   Maternal Fetal Medicine  [facility]

## 2024-09-06 NOTE — PROGRESS NOTES
A fetal ultrasound was completed. See Ob procedures in Epic for an interpretation and recommendations. Do not hesitate to contact us in [facility] if you have questions.    Azael Lora MD, MSCE  Maternal Fetal Medicine

## 2024-09-09 ENCOUNTER — TELEPHONE (OUTPATIENT)
Dept: OBGYN CLINIC | Facility: CLINIC | Age: 29
End: 2024-09-09

## 2024-09-09 NOTE — TELEPHONE ENCOUNTER
LMOM for patient that her FMLA Paperwork is ready for pickup in the [place] office, also lmom for patient to call us back to r/s OB Visits due to provider's schedule change.

## 2024-09-11 ENCOUNTER — TELEPHONE (OUTPATIENT)
Dept: OBGYN CLINIC | Facility: CLINIC | Age: 29
End: 2024-09-11

## 2024-09-12 ENCOUNTER — NURSE TRIAGE (OUTPATIENT)
Age: 29
End: 2024-09-12

## 2024-09-12 NOTE — TELEPHONE ENCOUNTER
"Patient calling in stating that she's 39w3d , pt reporting that she thinks she has a yeast infection. Pt reporting having mild discomfort, itching near vulva, and pt reporting clear liquid discharge that started 2-3 days ago. Pt denies leakage of fluid      Patient was advised that she should be seen, pt agreeable, attempted to find an appt for today, pt was offered an appt for 1045 pt refused stating she would arrive in time, patient was warm transferred to [place] in the office for further assistance.       Reason for Disposition   Patient wants to be seen    Answer Assessment - Initial Assessment Questions  1. DISCHARGE: \"Describe the discharge.\" (e.g., white, yellow, green, gray, foamy, cottage cheese-like)      Clear discharge   2. ODOR: \"Is there a bad odor?\"      Pt denies   3. ONSET: \"When did the discharge begin?\"      2-3 days ago   4. RASH: \"Is there a rash in that area?\" If Yes, ask: \"Describe it.\" (e.g., redness, blisters, sores, bumps)      Pt denies   5. ABDOMINAL PAIN: \"Are you having any abdominal pain?\" If Yes, ask: \"What does it feel like? \" (e.g., crampy, dull, intermittent, constant)       Pt denies   6. ABDOMINAL PAIN SEVERITY: If present, ask: \"How bad is it?\"  (e.g., mild, moderate, severe)   - MILD - doesn't interfere with normal activities    - MODERATE - interferes with normal activities or awakens from sleep    - SEVERE - patient doesn't want to move (R/O peritonitis)       Pt denies   7. CAUSE: \"What do you think is causing the discharge?\" \"Have you had the same problem before? What happened then?\"      Pt denies   8. OTHER SYMPTOMS: \"Do you have any other symptoms?\" (e.g., fever, itching, vaginal bleeding, pain with urination, injury to genital area)      Pt denies  fever, itching, vaginal bleeding, pain with urination, injury to genital area  9. PREGNANCY: \"Is there any chance you are pregnant?\" \"When was your last menstrual period?\"      29w3d    Protocols used: Vaginal " Discharge-ADULT-[place]

## 2024-09-12 NOTE — PROGRESS NOTES
Subjective:     Kimberlyn Wills is a 29 y.o.  female at 29w4d who presents with acute onset vulvar soreness and irritation. She reports that she first noticed the soreness last weekend. She reports itching of the external genitalia (more so that intravaginal itching). She reports that her underwear has been wet but the discharge seems to be more clear and odorless opposed to the typical cheesy appearance of yeast infection discharge. She has had a yeast infection before and this does not quite feel the same. She denies loss of fluid.  She reports that her BG has been under control. Her pregnancy is complicated by tobacco use, A2GDM, and BMI 36. She has a history of partial placenta abruption in 2018 and IUGR.     Patient Active Problem List   Diagnosis    ASCUS of cervix with negative high risk HPV    Bipolar 2 disorder, major depressive episode (HCC)    Dislocation of right patella    Generalized anxiety disorder    History of drug use    Incomplete RBBB    Osteochondral defect of femoral condyle    Seasonal allergies    Tobacco abuse    Medical marijuana use    History of IUGR (intrauterine growth retardation) and stillbirth, currently pregnant, first trimester    Insulin controlled gestational diabetes mellitus (GDM) in second trimester    26 weeks gestation of pregnancy    Obesity complicating pregnancy in second trimester    BMI 35.0-35.9,adult     Past Medical History:   Diagnosis Date    Asthma     Bipolar 2 disorder (HCC)     History of postpartum depression     Psychiatric disorder     depression and anxiety     Past Surgical History:   Procedure Laterality Date    DENTAL IMPLANT      ORTHOPEDIC SURGERY      right knee    WISDOM TOOTH EXTRACTION         Objective:    Vitals: Blood pressure 112/64, weight 97.2 kg (214 lb 3.2 oz), last menstrual period 2024.Body mass index is 36.77 kg/m².    Physical Exam  Vitals reviewed.   Constitutional:       General: She is not in acute distress.      Appearance: Normal appearance. She is well-developed. She is not ill-appearing, toxic-appearing or diaphoretic.   Cardiovascular:      Rate and Rhythm: Normal rate.   Pulmonary:      Effort: Pulmonary effort is normal. No respiratory distress.   Abdominal:      General: There is no distension.      Palpations: Abdomen is soft. There is no mass.      Tenderness: There is no abdominal tenderness. There is no guarding or rebound.   Genitourinary:     Comments: Gravid, nontender  Skin:     General: Skin is warm and dry.   Neurological:      Mental Status: She is alert and oriented to person, place, and time.   Psychiatric:         Mood and Affect: Mood normal.         Behavior: Behavior normal.         Assessment/Plan:    Assessment & Plan  Vulvovaginitis  Exam clinically consistent with yeast infection  Rx for diflucan sent to pharmacy  Culture collected: Will call with results and follow up accordingly.  Reviewed loose cotton clothing  Loose cotton underwear, avoid thongs.  Change promptly out of wet bathing suits or sweaty clothing.  No scented products vaginally.  Acidophilous probiotic daily  RTO symptoms worsen or fail to improve with treatment    Orders:    fluconazole (DIFLUCAN) 150 mg tablet; Take 1 tablet (150 mg total) by mouth daily for 2 doses    Molecular Vaginal Panel            Eleonora Sidhu MD  OB/GYN  She/her  9/13/2024  12:41 PM

## 2024-09-13 ENCOUNTER — OFFICE VISIT (OUTPATIENT)
Dept: OBGYN CLINIC | Facility: CLINIC | Age: 29
End: 2024-09-13
Payer: COMMERCIAL

## 2024-09-13 VITALS — SYSTOLIC BLOOD PRESSURE: 112 MMHG | WEIGHT: 214.2 LBS | BODY MASS INDEX: 36.77 KG/M2 | DIASTOLIC BLOOD PRESSURE: 64 MMHG

## 2024-09-13 DIAGNOSIS — N76.0 VULVOVAGINITIS: Primary | ICD-10-CM

## 2024-09-13 PROCEDURE — 99213 OFFICE O/P EST LOW 20 MIN: CPT | Performed by: OBSTETRICS & GYNECOLOGY

## 2024-09-13 PROCEDURE — 81514 NFCT DS BV&VAGINITIS DNA ALG: CPT | Performed by: OBSTETRICS & GYNECOLOGY

## 2024-09-13 RX ORDER — FLUCONAZOLE 150 MG/1
150 TABLET ORAL DAILY
Qty: 2 TABLET | Refills: 0 | Status: SHIPPED | OUTPATIENT
Start: 2024-09-13 | End: 2024-09-15

## 2024-09-14 LAB
C GLABRATA DNA VAG QL NAA+PROBE: NEGATIVE
C KRUSEI DNA VAG QL NAA+PROBE: NEGATIVE
CANDIDA SP 6 PNL VAG NAA+PROBE: POSITIVE
T VAGINALIS DNA VAG QL NAA+PROBE: NEGATIVE
VAGINOSIS/ITIS DNA PNL VAG PROBE+SIG AMP: NEGATIVE

## 2024-09-16 ENCOUNTER — TREATMENT (OUTPATIENT)
Dept: PERINATAL CARE | Facility: CLINIC | Age: 29
End: 2024-09-16

## 2024-09-16 DIAGNOSIS — O24.414 INSULIN CONTROLLED GESTATIONAL DIABETES MELLITUS (GDM) IN THIRD TRIMESTER: Primary | ICD-10-CM

## 2024-09-16 NOTE — PROGRESS NOTES
"Blood Sugar Log  Method of Reporting: [employer] Glucose Flowsheet   Date: 24    Patient: Kimberlyn Wills  : 1995    Estimated Date of Delivery: 24  GA: 30w0d   OB/GYN: [facility]    Reason for Documentation: Insulin controlled gestational diabetes     ASSESSMENT - REVIEW OF BG LOG           Assessment:  FBG: Not Well Controlled; Consistently Elevated (>90mg/dl)   PPBG: Overall controlled    PLAN     MEDICATIONS:   --Increase from   Lantus 16 to 20 units. units once daily at bedtime (9pm) Discussed with Dr. Marin    DIET:   Continue Assigned Meal Plan (including 3 meals and 3 snacks): 2000 calorie (CHO: 45-15-60-15-60-30) (PRO: 2/3-1-3/4-1-3/4-2)    BLOOD SUGAR MONITORING: (Glucometer: OneTouch Verio Flex)  Continue Testing B x per day (Fasting, 2 hour after start of each meal)    PHYSICAL ACTIVITY:  Continue walking (or other preferred physical activity) daily - recommend at least 20-30 minutes daily, preferably after dinner if able (unless otherwise instructed per OB)  Walks daily    MONITORING     EDUCATION: (Diabetes and Pregnancy Program)    Completed: Class 1 (Pt Goal: \"I will eat 3 meals and 3 snacks each day, including protein at each\"), Class 2, and Insulin Education    Needs Scheduled: None at this time, Follow-ups to be scheduled as indicated per weekly review of blood sugar logs    WEIGHT:     Pre-Gravid Wt Pre-Gravid BMI TWG   90.3 kg (199 lb) 34.14 4.355 kg (9 lb 9.6 oz)     FETAL MONITORITNG - ULTRASOUNDS  Recent Ultrasounds Findings: NML Growth/ERNESTO - 24  US Follow-Ups: Scheduled Appropriately, 24  Further Fetal Surveillance: Beginning at 32 weeks (NST twice weekly + ERNESTO once a week)    LABS  Lab Results   Component Value Date/Time    [id number] 187 (H) 2024 11:42 AM    GLUF 193 (H) 2024 11:42 AM    HGBA1C 5.1 2024 10:41 AM    HGBA1C 5.3 2024 10:49 AM     Active Orders (needing to be collected):  None    Diabetes Outside Support System: " Spouse/Family  Goal Achieved: on track    Date to report next: Thursday, 9/19/24    Pham Oakley MS, RD, [facility]   Diabetes and Pregnancy Program   Maternal Fetal Medicine  [facility]

## 2024-09-17 NOTE — PROGRESS NOTES
VISIT 29 yr old  at 30w3d: (+) edema - mild in ankles; Denies n/v/HA/cramping/vb/lof/dv; (+) smoking - 2 cigs/day - trying to quit completely; has nicotine lozenges; patch for back-up but hasn't used yet; urine neg/neg  Labs up to date; PNC - 28w growth WNL; f/u growth 34w; A2GDM - adjusting insulin and working closely with diabetes program  PNVs + DHA - tolerating daily  Good FM - r/megan 10 kicks/2 hrs  Tdap done 24     Breast pump - pump ordered prior; Peds - established; Contraception - POP to MILLY;  Yellow folder given and reviewed; Delivery Care Consent reviewed and signed   Encouraged hydration. Reviewed signs and symptoms of labor and preE and when to call  Encouraged the flu vaccine and COVID booster in pregnancy; Encouraged infection prevention/handwashing.  RTO in 2 weeks for routine ob check or sooner if needed

## 2024-09-19 ENCOUNTER — ROUTINE PRENATAL (OUTPATIENT)
Dept: OBGYN CLINIC | Facility: CLINIC | Age: 29
End: 2024-09-19

## 2024-09-19 ENCOUNTER — TELEPHONE (OUTPATIENT)
Facility: HOSPITAL | Age: 29
End: 2024-09-19

## 2024-09-19 VITALS — SYSTOLIC BLOOD PRESSURE: 118 MMHG | BODY MASS INDEX: 36.22 KG/M2 | WEIGHT: 211 LBS | DIASTOLIC BLOOD PRESSURE: 66 MMHG

## 2024-09-19 DIAGNOSIS — Z3A.30 30 WEEKS GESTATION OF PREGNANCY: Primary | ICD-10-CM

## 2024-09-19 DIAGNOSIS — O24.414 INSULIN CONTROLLED GESTATIONAL DIABETES MELLITUS (GDM) IN THIRD TRIMESTER: ICD-10-CM

## 2024-09-19 PROCEDURE — PNV: Performed by: PHYSICIAN ASSISTANT

## 2024-09-19 NOTE — TELEPHONE ENCOUNTER
Left voicemail informing patient the Dr will unfortunately be unavailable during her nst/danii appointment on 11/5. Informed patient we can keep the scheduled appointment and the tests will still be preformed. The Dr will then review the results when she becomes available. Once she has reviewed everything she will reach out via [employer]. Requested she give our office a call back at [phone] with any questions or if she would prefer to reschedule to a day/time when the provider would be available.

## 2024-09-20 ENCOUNTER — TREATMENT (OUTPATIENT)
Dept: PERINATAL CARE | Facility: CLINIC | Age: 29
End: 2024-09-20

## 2024-09-20 DIAGNOSIS — Z3A.30 30 WEEKS GESTATION OF PREGNANCY: ICD-10-CM

## 2024-09-20 DIAGNOSIS — Z79.4 INSULIN DOSE CHANGED (HCC): ICD-10-CM

## 2024-09-20 DIAGNOSIS — O24.414 INSULIN CONTROLLED GESTATIONAL DIABETES MELLITUS (GDM) IN THIRD TRIMESTER: Primary | ICD-10-CM

## 2024-09-20 NOTE — PATIENT INSTRUCTIONS
MEDICATIONS:     Due to most recent fasting blood sugar >90,  Increase Lantus from 20 to 22 units once daily at bedtime (9pm)    DIET:   Continue Assigned Meal Plan (including 3 meals and 3 snacks): 2000 calorie (CHO: 45-15-60-15-60-30) (PRO: /3-1-3/-1-3/4-2)    BLOOD SUGAR MONITORING: (Glucometer: OneTouch Verio Flex)  Continue Testing B x per day (Fasting, 2 hour after start of each meal)    PHYSICAL ACTIVITY:  Continue walking (or other preferred physical activity) daily - recommend at least 20-30 minutes daily, preferably after dinner if able (unless otherwise instructed per OB)  Walks daily

## 2024-09-20 NOTE — PROGRESS NOTES
"Blood Sugar Log  Method of Reporting: [employer] Glucose Flowsheet   Date: 24    Patient: Kimberlyn Wills  : 1995    Estimated Date of Delivery: 24  GA: 30w4d   OB/GYN: [facility]    Reason for Documentation: Insulin controlled gestational diabetes     ASSESSMENT - REVIEW OF BG LOG         Assessment:  FBG:  Improved; Most recent above goal  PPBG: Overall controlled    PLAN     MEDICATIONS:     Due to most recent fasting blood sugar >90,  Increase Lantus from 20 to 22 units once daily at bedtime (9pm)    DIET:   Continue Assigned Meal Plan (including 3 meals and 3 snacks): 2000 calorie (CHO: 45-15-60-15-60-30) (PRO: 2/3-1-3/4-1-3/4-2)    BLOOD SUGAR MONITORING: (Glucometer: OneTouch Verio Flex)  Continue Testing B x per day (Fasting, 2 hour after start of each meal)    PHYSICAL ACTIVITY:  Continue walking (or other preferred physical activity) daily - recommend at least 20-30 minutes daily, preferably after dinner if able (unless otherwise instructed per OB)  Walks daily    MONITORING     EDUCATION: (Diabetes and Pregnancy Program)    Completed: Class 1 (Pt Goal: \"I will eat 3 meals and 3 snacks each day, including protein at each\"), Class 2, and Insulin Education    Needs Scheduled: None at this time, Follow-ups to be scheduled as indicated per weekly review of blood sugar logs    WEIGHT:     Pre-Gravid Wt Pre-Gravid BMI TWG   90.3 kg (199 lb) 34.14 5.443 kg (12 lb)     FETAL MONITORITNG - ULTRASOUNDS  Recent Ultrasounds Findings: NML Growth/ERNESTO - 24  US Follow-Ups: Scheduled Appropriately, 10/1/24  Further Fetal Surveillance: Beginning at 32 weeks (NST twice weekly + ERNESTO once a week)    LABS  Lab Results   Component Value Date/Time    [id number] 187 (H) 2024 11:42 AM    GLUF 193 (H) 2024 11:42 AM    HGBA1C 5.1 2024 10:41 AM    HGBA1C 5.3 2024 10:49 AM     Active Orders (needing to be collected):  None    Diabetes Outside Support System: Spouse/Family  Goal " Achieved: on track    Date to report next: Weekly    Cinthia Lorenzo RD, MS  Diabetes and Pregnancy Program   Maternal Fetal Medicine  [facility]

## 2024-09-25 ENCOUNTER — NURSE TRIAGE (OUTPATIENT)
Age: 29
End: 2024-09-25

## 2024-09-25 ENCOUNTER — HOSPITAL ENCOUNTER (OUTPATIENT)
Facility: HOSPITAL | Age: 29
Discharge: HOME | End: 2024-09-25
Attending: OBSTETRICS & GYNECOLOGY | Admitting: OBSTETRICS & GYNECOLOGY
Payer: COMMERCIAL

## 2024-09-25 VITALS
TEMPERATURE: 98.9 F | OXYGEN SATURATION: 100 % | SYSTOLIC BLOOD PRESSURE: 118 MMHG | RESPIRATION RATE: 18 BRPM | HEART RATE: 103 BPM | DIASTOLIC BLOOD PRESSURE: 73 MMHG

## 2024-09-25 PROBLEM — O36.8130 DECREASED FETAL MOVEMENTS IN THIRD TRIMESTER: Status: ACTIVE | Noted: 2024-09-25

## 2024-09-25 PROBLEM — O47.9 UTERINE CONTRACTIONS: Status: ACTIVE | Noted: 2024-09-25

## 2024-09-25 PROCEDURE — 59025 FETAL NON-STRESS TEST: CPT | Performed by: PHYSICIAN ASSISTANT

## 2024-09-25 PROCEDURE — 99213 OFFICE O/P EST LOW 20 MIN: CPT

## 2024-09-25 NOTE — PROGRESS NOTES
L&D Triage Note - OB/GYN  Kimberlyn Wills 29 y.o. female MRN: [id number]  Unit/Bed#: LD TRIAGE 2- Encounter: [id number]      ASSESSMENT/PLAN  Kimberlyn Wills is a 29 y.o.  at 31w2d with decreased FM      1) Uterine contractions  Assessment & Plan  Not appreciated by patient. Cx cl/thick. Advised hydration, rest and Tylenol.     Insulin controlled gestational diabetes mellitus (GDM) in second trimester  Assessment & Plan        * Decreased fetal movements in third trimester  Assessment & Plan  NST reactive and reassuring with ctxns noted  ERNESTO 24.3cm         2)  Discharge instructions  - Patient instructed to call if experiencing worsening contractions, vaginal bleeding, loss of fluid or decreased fetal movement.  -We focused on method of obtaining FKCs. Making sure pt is relaxed, has some cold fluids and focuses on fetus.   -Advised hydration, rest and Tylenol  - Will follow up with OBGYN in office      She is a patient of [facility]   D/w Dr. Samson, on call OBGYN Attending Physician  ______________    SUBJECTIVE    WARREN: Estimated Date of Delivery: 24    HPI:  29 y.o.  31w2d presents with complaint of decreased FM. Pt notes that she is still getting 10 movements in a 2 hour time period, but not as readily as she was able to before. 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 She denies vb/lof. No recent illness. No changes in meds, habits or routine. Denies cramping or ctxns.      Contractions: none  Leakage of fluid: none  Vaginal Bleeding: none  Fetal movement: present but decreased     Her obstetrical history is significant for GDM, h/o tobacco use.     ROS:  Constitutional: Negative  Respiratory: Negative  Cardiovascular: Negative    Gastrointestinal: Negative    Physical Exam  General: Well appearing, no distress  Respiratory: Unlabored breathing  Cardiovascular: Regular rate  Abdomen: Soft, gravid, nontender   Fundal Height: Appropriate for gestational  "age.  Extremities: Warm and well perfused.  Non tender.      OBJECTIVE:  /73   Pulse 103   Temp 98.9 °F (37.2 °C) (Oral)   Resp 18   LMP 02/19/2024   SpO2 100%   There is no height or weight on file to calculate BMI.  Labs: No results found for this or any previous visit (from the past 24 hour(s)).      SVE:  Cervical Dilation: Closed  Cervical Effacement: 0  Cervical Consistency: Firm  Fetal Station: Ballotable  Presentation: Vertex  Method: Manual  OB Examiner: Yoselyn/thick, long/high    FHT: 150bpm  Baseline Rate (FHR): 135 bpm  Variability: Moderate  Accelerations: 10 x 10 (<32 weeks), With fetal movement  Decelerations: None    TOCO: pt candida q 2-5 minutes, she is not appreciating the ctxns  Contraction Frequency (minutes): 2-3  Contraction Duration (seconds): 50-80  Contraction Intensity: Mild    IMAGING:      TAUS   ERNESTO      - Q1 5.04cm     - Q2 7.00cm     - Q3 5.37cm     - Q4 7.03cm     - Total: 24.3cm   Placenta: anterior    Presentation: vertex      Renea Miller PA-C  OB/GYN   9/25/2024  12:59 PM      Portions of the record may have been created with voice recognition software.  Occasional wrong word or \"sound a like\" substitutions may have occurred due to the inherent limitations of voice recognition software.  Read the chart carefully and recognize, using context, where substitutions have occurred    "

## 2024-09-25 NOTE — TELEPHONE ENCOUNTER
"31w2d OB patient calling in, reports decreased fetal movement since Monday. Last felt movement 2 hours ago. States baby met kick counts last night, but took a while for baby to reach 10 kicks in 2 hours. Patient states baby is typically much more active and expresses concerns for decrease in normal movement pattern.     Denies leakage of fluid, vaginal bleeding, contractions.     Advised patient go to L&D now at [facility] for further evaluation. Patient verbalized understanding.  ETA: 45 mins.    ESC sent to on-call provider and charge RN as rosibel.  Reason for Disposition   Pregnant 23 or more weeks with normal kick count BUT mother still thinks there is something wrong    Answer Assessment - Initial Assessment Questions  1. FETAL MOVEMENT: \"Has the baby's movement decreased or changed significantly from normal?\" (e.g., yes, no; describe)      Yes, changed from normal  2. WARREN: \"What date are you expecting to deliver?\"       11/25/24  3. PREGNANCY: \"How many weeks pregnant are you?\"       31w2d  4. OTHER SYMPTOMS: \"Do you have any other symptoms?\" (e.g., abdominal pain, leaking fluid from vagina, vaginal bleeding, etc.)      Denies    Protocols used: Pregnancy - Decreased Fetal Movement-ADULT-[place]    "

## 2024-09-30 ENCOUNTER — TREATMENT (OUTPATIENT)
Dept: PERINATAL CARE | Facility: CLINIC | Age: 29
End: 2024-09-30

## 2024-09-30 DIAGNOSIS — Z3A.32 32 WEEKS GESTATION OF PREGNANCY: ICD-10-CM

## 2024-09-30 DIAGNOSIS — O24.414 INSULIN CONTROLLED GESTATIONAL DIABETES MELLITUS (GDM) IN THIRD TRIMESTER: Primary | ICD-10-CM

## 2024-09-30 NOTE — PROGRESS NOTES
"Blood Sugar Log  Method of Reporting: [employer] Glucose Flowsheet   Date: 24    Patient: Kimberlyn Wills  : 1995    Estimated Date of Delivery: 24  GA: 32w0d   OB/GYN: [facility]    Reason for Documentation: Insulin controlled gestational diabetes     ASSESSMENT - REVIEW OF BG LOG         Assessment:  FBG: Not Well Controlled; Consistently Elevated (>90mg/dl)   PPBG: Overall Controlled <120mg/dl    PLAN     MEDICATIONS:     Due to fasting blood sugar >90,  Increase Lantus from 22 to 26 units once daily at bedtime (9pm)    DIET:   Continue Assigned Meal Plan (including 3 meals and 3 snacks): 2000 calorie (CHO: 45-15-60-15-60-30) (PRO: 2/3-1-3/4-1-3/4-2)    BLOOD SUGAR MONITORING: (Glucometer: OneTouch Verio Flex)  Continue Testing B x per day (Fasting, 2 hour after start of each meal)    PHYSICAL ACTIVITY:  Continue walking (or other preferred physical activity) daily - recommend at least 20-30 minutes daily, preferably after dinner if able (unless otherwise instructed per OB)  Walks daily    MONITORING     EDUCATION: (Diabetes and Pregnancy Program)    Completed: Class 1 (Pt Goal: \"I will eat 3 meals and 3 snacks each day, including protein at each\"), Class 2, and Insulin Education    Needs Scheduled: None at this time, Follow-ups to be scheduled as indicated per weekly review of blood sugar logs    WEIGHT:     Pre-Gravid Wt Pre-Gravid BMI TWG   90.3 kg (199 lb) 34.14 5.443 kg (12 lb)     FETAL MONITORITNG - ULTRASOUNDS  Recent Ultrasounds Findings: NML Growth/ERNESTO - 24  US Follow-Ups: Scheduled Appropriately, 10/1/24  Further Fetal Surveillance: Beginning at 32 weeks (NST twice weekly + ERNESTO once a week)    LABS  Lab Results   Component Value Date/Time    [id number] 187 (H) 2024 11:42 AM    GLUF 193 (H) 2024 11:42 AM    HGBA1C 5.1 2024 10:41 AM    HGBA1C 5.3 2024 10:49 AM     Active Orders (needing to be collected):  None    Diabetes Outside Support System: " Spouse/Family  Goal Achieved: On track    Date to report next: Weekly    Cinthia Lorenzo RD, MS  Diabetes and Pregnancy Program   Maternal Fetal Medicine  [facility]

## 2024-09-30 NOTE — PATIENT INSTRUCTIONS
MEDICATIONS:     Due to most recent fasting blood sugar >90,  Increase Lantus from 22 to 26 units once daily at bedtime (9pm)    DIET:   Continue Assigned Meal Plan (including 3 meals and 3 snacks): 2000 calorie (CHO: 45-15-60-15-60-30) (PRO: /3-1-3/-1-3/4-2)    BLOOD SUGAR MONITORING: (Glucometer: OneTouch Verio Flex)  Continue Testing B x per day (Fasting, 2 hour after start of each meal)    PHYSICAL ACTIVITY:  Continue walking (or other preferred physical activity) daily - recommend at least 20-30 minutes daily, preferably after dinner if able (unless otherwise instructed per OB)  Walks daily

## 2024-09-30 NOTE — PATIENT INSTRUCTIONS
Thank you for choosing us for your  care today.  If you have any questions about your ultrasound or care, please do not hesitate to contact us or your primary obstetrician.        Some general instructions for your pregnancy are:    Exercise: Aim for 150 minutes per week of regular exercise.  Walking is great!  Nutrition: Choose healthy sources of calcium, iron, and protein.  Avoid ultraprocessed foods and added sugar.  Learn about Preeclampsia: preeclampsia is a common, potentially serious high blood pressure complication in pregnancy.  A blood pressure of 140mmHg (systolic or top number) or 90mmHg (diastolic or bottom number) should be evaluated by your doctor.  Aspirin is sometimes prescribed in early pregnancy to prevent preeclampsia in women with risk factors - ask your obstetrician if you should be on this medication.  For more resources, visit:  https://www.highriskpregnancyinfo.org/preeclampsia  If you smoke, please try to quit completely but also try to reduce your smoking by as much as possible (as soon as possible).  Do not vape.  Please also avoid cannabis products.  Other warning signs to watch out for in pregnancy or postpartum: chest pain, obstructed breathing or shortness of breath, seizures, thoughts of hurting yourself or your baby, bleeding, a painful or swollen leg, fever, or headache (see [employer] POST-BIRTH Warning Signs campaign).  If these happen call 911.  Itching is also not normal in pregnancy and if you experience this, especially over your hands and feet, potentially worse at night, notify your doctors.         Nonstress Test for Pregnancy   WHAT YOU NEED TO KNOW:   What do I need to know about a nonstress test?  A nonstress test measures your baby's heart rate and movements. Nonstress means that no stress will be placed on your baby during the test.  How do I prepare for a nonstress test?  Your healthcare provider will talk to you about how to prepare for this test. Your provider  may tell you to eat and drink plenty of liquids before your test. If you smoke, you may be asked not to smoke within 2 hours before the test. Your provider will also tell you which medicines to take or not take on the day of your test.  What will happen during a nonstress test?  You may be asked to lie down or recline back for the test on a bed. One or 2 belts with sensors will be placed around your abdomen. Your baby's heart rate will be recorded with a machine. If your baby does not move, your baby may be asleep. Your healthcare provider may make a noise near your abdomen to try to wake your baby. The test usually takes about 20 minutes, but can take longer if your baby needs to be awakened.        What do I need to know about the test results?  Your baby will be expected to move at least 2 times for a certain amount of time. Your baby's heart rate will be expected to go up by a certain number of beats per minute during movement. If your baby does not move as expected, the test may need to be repeated or you may need other tests.  CARE AGREEMENT:   You have the right to help plan your care. Learn about your health condition and how it may be treated. Discuss treatment options with your healthcare providers to decide what care you want to receive. You always have the right to refuse treatment. The above information is an  only. It is not intended as medical advice for individual conditions or treatments. Talk to your doctor, nurse or pharmacist before following any medical regimen to see if it is safe and effective for you.  © Copyright [employer] 2023 Information is for End User's use only and may not be sold, redistributed or otherwise used for commercial purposes.     Kick Counts in Pregnancy   AMBULATORY CARE:   Kick counts  measure how much your baby is moving in your womb. A kick from your baby can be felt as a twist, turn, swish, roll, or jab. It is common to feel your baby kicking at 26 to 28  weeks of pregnancy. You may feel your baby kick as early as 20 weeks of pregnancy. You may want to start counting at 28 weeks.   Contact your doctor immediately if:   You feel a change in the number of kicks or movements of your baby.      You feel fewer than 10 kicks within 2 hours.      You have questions or concerns about your baby's movements.     Why measure kick counts:  Your baby's movement may provide information about your baby's health. He or she may move less, or not at all, if there are problems. Your baby may move less if he or she is not getting enough oxygen or nutrition from the placenta. Do not smoke while you are pregnant. Smoking decreases the amount of oxygen that gets to your baby. Talk to your healthcare provider if you need help to quit smoking. Tell your healthcare provider as soon as you feel a change in your baby's movements.  When to measure kick counts:   Measure kick counts at the same time every day.       Measure kick counts when your baby is awake and most active. Your baby may be most active in the evening.     How to measure kick counts:  Check that your baby is awake before you measure kick counts. You can wake up your baby by lightly pushing on your belly, walking, or drinking something cold. Your healthcare provider may tell you different ways to measure kick counts. You may be told to do the following:  Use a chart or clock to keep track of the time you start and finish counting.      Sit in a chair or lie on your left side.      Place your hands on the largest part of your belly.      Count until you reach 10 kicks. Write down how much time it takes to count 10 kicks.      It may take 30 minutes to 2 hours to count 10 kicks. It should not take more than 2 hours to count 10 kicks.     Follow up with your doctor as directed:  Write down your questions so you remember to ask them during your visits.   © Copyright [employer] 2023 Information is for End User's use only and may not be  sold, redistributed or otherwise used for commercial purposes.  The above information is an  only. It is not intended as medical advice for individual conditions or treatments. Talk to your doctor, nurse or pharmacist before following any medical regimen to see if it is safe and effective for you.

## 2024-10-01 ENCOUNTER — ULTRASOUND (OUTPATIENT)
Dept: PERINATAL CARE | Facility: CLINIC | Age: 29
End: 2024-10-01
Payer: COMMERCIAL

## 2024-10-01 VITALS
HEIGHT: 64 IN | WEIGHT: 214 LBS | HEART RATE: 108 BPM | SYSTOLIC BLOOD PRESSURE: 130 MMHG | BODY MASS INDEX: 36.54 KG/M2 | DIASTOLIC BLOOD PRESSURE: 62 MMHG

## 2024-10-01 DIAGNOSIS — O24.414 INSULIN CONTROLLED GESTATIONAL DIABETES MELLITUS (GDM) IN THIRD TRIMESTER: ICD-10-CM

## 2024-10-01 DIAGNOSIS — Z3A.32 32 WEEKS GESTATION OF PREGNANCY: Primary | ICD-10-CM

## 2024-10-01 PROCEDURE — 76815 OB US LIMITED FETUS(S): CPT | Performed by: STUDENT IN AN ORGANIZED HEALTH CARE EDUCATION/TRAINING PROGRAM

## 2024-10-01 PROCEDURE — 59025 FETAL NON-STRESS TEST: CPT | Performed by: STUDENT IN AN ORGANIZED HEALTH CARE EDUCATION/TRAINING PROGRAM

## 2024-10-01 PROCEDURE — 99213 OFFICE O/P EST LOW 20 MIN: CPT | Performed by: STUDENT IN AN ORGANIZED HEALTH CARE EDUCATION/TRAINING PROGRAM

## 2024-10-01 NOTE — PROGRESS NOTES
This patient received  care under my supervision on 10/01/24 at 32w1d gestational age at [facility].  NST is reactive.  -Erlinda Madrigal MD

## 2024-10-01 NOTE — PROGRESS NOTES
Repeat Non-Stress Testing:    Patient verbalizes +FM. Pt denies ALL:               Leaking of fluid   Contractions   Vaginal bleeding   Decreased fetal movement    Patient is performing daily kick counts. Patient has no questions or concerns.   NST strip reviewed by Dr. Madrigal.

## 2024-10-02 NOTE — PROGRESS NOTES
VISIT 29 yr old  at 32w3d: (+) edema - ankles - comes and goes; Denies n/v/HA/cramping/vb/lof/dv; (+) smoking - doing lozengers throughout the day; still one cig in AM and one cig in PM; urine neg/neg  A2GDM - working closely with diabetes program - titrating insulin  Labs up to date; PNC - started twice weekly APFS - polyhydramnios; f/u growth 10/18   PNVs + DHA - tolerating daily  Good FM - r/megan 10 kicks/2 hrs  Tdap done 24      Breast pump - pump ordered prior; Peds - established; Contraception - POP to MILLY;  Yellow folder given and reviewed/Delivery Care Consent reviewed and signed prior  Encouraged hydration. Reviewed signs and symptoms of labor and preE and when to call  Encouraged RSV vaccine - reviewed risks/benefits - administered today without issue  Encouraged the flu vaccine and COVID booster in pregnancy; Encouraged infection prevention/handwashing.  RTO in 2 weeks for routine ob check or sooner if needed

## 2024-10-03 ENCOUNTER — ROUTINE PRENATAL (OUTPATIENT)
Dept: OBGYN CLINIC | Facility: CLINIC | Age: 29
End: 2024-10-03
Payer: COMMERCIAL

## 2024-10-03 VITALS
WEIGHT: 214 LBS | HEIGHT: 64 IN | DIASTOLIC BLOOD PRESSURE: 64 MMHG | BODY MASS INDEX: 36.54 KG/M2 | SYSTOLIC BLOOD PRESSURE: 108 MMHG

## 2024-10-03 DIAGNOSIS — O24.414 INSULIN CONTROLLED GESTATIONAL DIABETES MELLITUS (GDM) IN THIRD TRIMESTER: ICD-10-CM

## 2024-10-03 DIAGNOSIS — Z29.11 NEED FOR RSV IMMUNIZATION: ICD-10-CM

## 2024-10-03 DIAGNOSIS — Z3A.32 32 WEEKS GESTATION OF PREGNANCY: Primary | ICD-10-CM

## 2024-10-03 PROBLEM — O09.293 HISTORY OF IUGR (INTRAUTERINE GROWTH RETARDATION) AND STILLBIRTH, CURRENTLY PREGNANT, THIRD TRIMESTER: Status: ACTIVE | Noted: 2024-05-18

## 2024-10-03 PROCEDURE — PNV: Performed by: PHYSICIAN ASSISTANT

## 2024-10-03 PROCEDURE — 90678 RSV VACC PREF BIVALENT IM: CPT | Performed by: PHYSICIAN ASSISTANT

## 2024-10-03 PROCEDURE — 90471 IMMUNIZATION ADMIN: CPT | Performed by: PHYSICIAN ASSISTANT

## 2024-10-04 ENCOUNTER — ROUTINE PRENATAL (OUTPATIENT)
Facility: HOSPITAL | Age: 29
End: 2024-10-04
Payer: COMMERCIAL

## 2024-10-04 VITALS
BODY MASS INDEX: 36.47 KG/M2 | DIASTOLIC BLOOD PRESSURE: 54 MMHG | HEART RATE: 97 BPM | HEIGHT: 64 IN | SYSTOLIC BLOOD PRESSURE: 100 MMHG | WEIGHT: 213.6 LBS

## 2024-10-04 DIAGNOSIS — Z3A.32 32 WEEKS GESTATION OF PREGNANCY: ICD-10-CM

## 2024-10-04 DIAGNOSIS — O24.414 INSULIN CONTROLLED GESTATIONAL DIABETES MELLITUS (GDM) IN THIRD TRIMESTER: Primary | ICD-10-CM

## 2024-10-04 PROCEDURE — 59025 FETAL NON-STRESS TEST: CPT | Performed by: NURSE PRACTITIONER

## 2024-10-04 NOTE — PROGRESS NOTES
Repeat Non-Stress Testing:    Patient verbalizes +FM. Pt denies ALL:               Leaking of fluid   Contractions   Vaginal bleeding   Decreased fetal movement    Patient is performing daily kick counts. Patient has no questions or concerns.   NST strip reviewed by JR Gonzalez.

## 2024-10-04 NOTE — LETTER
NST sleeve cover sheet    Patient name: Kimberlyn Wills  : 1995  MRN: [id number]    WARREN: Estimated Date of Delivery: 24    Obstetrician: [facility]    Reason(s) for testing: GDM,     Testing frequency:    ___  2x/wk  ___  1x/wk  ___  Dopplers  ___  BPP?      Last growth scan: __________, _________, _________    Baby:      Male   /   Female   /   [place]             Baby Name: ___________________            IOL or  C/S: _____________________

## 2024-10-04 NOTE — PROGRESS NOTES
[facility]: Ms. Wills was seen today at 32w4d gestational age for NST (found under the pregnancy episode) which I reviewed the RN assessment and agree.       Esthela NORRIS

## 2024-10-07 ENCOUNTER — TREATMENT (OUTPATIENT)
Dept: PERINATAL CARE | Facility: CLINIC | Age: 29
End: 2024-10-07

## 2024-10-07 DIAGNOSIS — O24.414 INSULIN CONTROLLED GESTATIONAL DIABETES MELLITUS (GDM) IN THIRD TRIMESTER: Primary | ICD-10-CM

## 2024-10-07 NOTE — PROGRESS NOTES
"Blood Sugar Log  Method of Reporting: [employer] Glucose Flowsheet   Date: 10/07/24    Patient: Kimberlyn Wlils  : 1995    Estimated Date of Delivery: 24  GA: 33w0d   OB/GYN: [facility]    DX; Insulin controled gestational diabtes    Reason for Documentation: Insulin controlled gestational diabetes     ASSESSMENT - REVIEW OF BG LOG         Assessment:  FBG: Few Elevations - Not consistently elevated at this time  PPBG: Overall Controlled <120mg/dl    PLAN     MEDICATIONS:     Due to fasting blood sugar >90,  Lantus--continue 26 units once daily at bedtime (9pm)    DIET:   Continue Assigned Meal Plan (including 3 meals and 3 snacks): 2000 calorie (CHO: 45-15-60-15-60-30) (PRO: 2/3-1-3/4-1-3/4-2)    BLOOD SUGAR MONITORING: (Glucometer: OneTouch Verio Flex)  Continue Testing B x per day (Fasting, 2 hour after start of each meal)    PHYSICAL ACTIVITY:  Continue walking (or other preferred physical activity) daily - recommend at least 20-30 minutes daily, preferably after dinner if able (unless otherwise instructed per OB)  Walks daily    MONITORING     EDUCATION: (Diabetes and Pregnancy Program)    Completed: Class 1 (Pt Goal: \"I will eat 3 meals and 3 snacks each day, including protein at each\"), Class 2, and Insulin Education    Needs Scheduled: None at this time, Follow-ups to be scheduled as indicated per weekly review of blood sugar logs    WEIGHT:     Pre-Gravid Wt Pre-Gravid BMI TWG   90.3 kg (199 lb) 34.14 6.623 kg (14 lb 9.6 oz)     FETAL MONITORITNG - ULTRASOUNDS  Recent Ultrasounds Findings: NML Growth/ERNESTO - 24  US Follow-Ups: Scheduled Appropriately, 10/1/24  Further Fetal Surveillance: Beginning at 32 weeks (NST twice weekly + ERNESTO once a week)    LABS  Lab Results   Component Value Date/Time    [id number] 187 (H) 2024 11:42 AM    GLUF 193 (H) 2024 11:42 AM    HGBA1C 5.1 2024 10:41 AM    HGBA1C 5.3 2024 10:49 AM     Active Orders (needing to be collected):  " None    Diabetes Outside Support System: Spouse/Family  Goal Achieved: On track    Date to report next: Weekly    Pham Oakley MS, RD< [facility]  Diabetes and Pregnancy Program   Maternal Fetal Medicine  [facility]

## 2024-10-08 ENCOUNTER — ULTRASOUND (OUTPATIENT)
Dept: PERINATAL CARE | Facility: CLINIC | Age: 29
End: 2024-10-08
Payer: COMMERCIAL

## 2024-10-08 VITALS
BODY MASS INDEX: 37.32 KG/M2 | HEIGHT: 64 IN | WEIGHT: 218.6 LBS | DIASTOLIC BLOOD PRESSURE: 58 MMHG | SYSTOLIC BLOOD PRESSURE: 102 MMHG

## 2024-10-08 DIAGNOSIS — O40.3XX0 POLYHYDRAMNIOS IN THIRD TRIMESTER COMPLICATION, SINGLE OR UNSPECIFIED FETUS: ICD-10-CM

## 2024-10-08 DIAGNOSIS — Z3A.33 33 WEEKS GESTATION OF PREGNANCY: Primary | ICD-10-CM

## 2024-10-08 DIAGNOSIS — O24.414 INSULIN CONTROLLED GESTATIONAL DIABETES MELLITUS (GDM) IN THIRD TRIMESTER: ICD-10-CM

## 2024-10-08 PROCEDURE — 76818 FETAL BIOPHYS PROFILE W/NST: CPT | Performed by: STUDENT IN AN ORGANIZED HEALTH CARE EDUCATION/TRAINING PROGRAM

## 2024-10-08 NOTE — PROGRESS NOTES
This patient received  care under my supervision on 10/08/24 at 33w1d gestational age at [facility].  The note is contained in the ultrasound report located under OB Procedures tab in Epic.  Please call our office at [phone] with questions.  -Erlinda Madrigal MD

## 2024-10-09 NOTE — PATIENT INSTRUCTIONS
Kick Counts in Pregnancy   AMBULATORY CARE:   Kick counts  measure how much your baby is moving in your womb. A kick from your baby can be felt as a twist, turn, swish, roll, or jab. It is common to feel your baby kicking at 26 to 28 weeks of pregnancy. You may feel your baby kick as early as 20 weeks of pregnancy. You may want to start counting at 28 weeks.   Contact your doctor immediately if:   You feel a change in the number of kicks or movements of your baby.      You feel fewer than 10 kicks within 2 hours.      You have questions or concerns about your baby's movements.     Why measure kick counts:  Your baby's movement may provide information about your baby's health. He or she may move less, or not at all, if there are problems. Your baby may move less if he or she is not getting enough oxygen or nutrition from the placenta. Do not smoke while you are pregnant. Smoking decreases the amount of oxygen that gets to your baby. Talk to your healthcare provider if you need help to quit smoking. Tell your healthcare provider as soon as you feel a change in your baby's movements.  When to measure kick counts:   Measure kick counts at the same time every day.       Measure kick counts when your baby is awake and most active. Your baby may be most active in the evening.     How to measure kick counts:  Check that your baby is awake before you measure kick counts. You can wake up your baby by lightly pushing on your belly, walking, or drinking something cold. Your healthcare provider may tell you different ways to measure kick counts. You may be told to do the following:  Use a chart or clock to keep track of the time you start and finish counting.      Sit in a chair or lie on your left side.      Place your hands on the largest part of your belly.      Count until you reach 10 kicks. Write down how much time it takes to count 10 kicks.      It may take 30 minutes to 2 hours to count 10 kicks. It should not take more  than 2 hours to count 10 kicks.     Follow up with your doctor as directed:  Write down your questions so you remember to ask them during your visits.   © Copyright [employer] 2023 Information is for End User's use only and may not be sold, redistributed or otherwise used for commercial purposes.  The above information is an  only. It is not intended as medical advice for individual conditions or treatments. Talk to your doctor, nurse or pharmacist before following any medical regimen to see if it is safe and effective for you. Nonstress Test for Pregnancy   WHAT YOU NEED TO KNOW:   What do I need to know about a nonstress test?  A nonstress test measures your baby's heart rate and movements. Nonstress means that no stress will be placed on your baby during the test.  How do I prepare for a nonstress test?  Your healthcare provider will talk to you about how to prepare for this test. Your provider may tell you to eat and drink plenty of liquids before your test. If you smoke, you may be asked not to smoke within 2 hours before the test. Your provider will also tell you which medicines to take or not take on the day of your test.  What will happen during a nonstress test?  You may be asked to lie down or recline back for the test on a bed. One or 2 belts with sensors will be placed around your abdomen. Your baby's heart rate will be recorded with a machine. If your baby does not move, your baby may be asleep. Your healthcare provider may make a noise near your abdomen to try to wake your baby. The test usually takes about 20 minutes, but can take longer if your baby needs to be awakened.        What do I need to know about the test results?  Your baby will be expected to move at least 2 times for a certain amount of time. Your baby's heart rate will be expected to go up by a certain number of beats per minute during movement. If your baby does not move as expected, the test may need to be repeated or you  may need other tests.  CARE AGREEMENT:   You have the right to help plan your care. Learn about your health condition and how it may be treated. Discuss treatment options with your healthcare providers to decide what care you want to receive. You always have the right to refuse treatment. The above information is an  only. It is not intended as medical advice for individual conditions or treatments. Talk to your doctor, nurse or pharmacist before following any medical regimen to see if it is safe and effective for you.  © 2023 Information is for End User's use only and may not be sold, redistributed or otherwise used for commercial purposes. Thank you for choosing us for your  care today.  If you have any questions about your ultrasound or care, please do not hesitate to contact us or your primary obstetrician.        Some general instructions for your pregnancy are:    Exercise: Aim for 150 minutes per week of regular exercise.  Walking is great!  Nutrition: Choose healthy sources of calcium, iron, and protein.  Avoid ultraprocessed foods and added sugar.  Learn about Preeclampsia: preeclampsia is a common, potentially serious high blood pressure complication in pregnancy.  A blood pressure of 140mmHg (systolic or top number) or 90mmHg (diastolic or bottom number) should be evaluated by your doctor.  Aspirin is sometimes prescribed in early pregnancy to prevent preeclampsia in women with risk factors - ask your obstetrician if you should be on this medication.  For more resources, visit:  https://www.highriskpregnancyinfo.org/preeclampsia  If you smoke, please try to quit completely but also try to reduce your smoking by as much as possible (as soon as possible).  Do not vape.  Please also avoid cannabis products.  Other warning signs to watch out for in pregnancy or postpartum: chest pain, obstructed breathing or shortness of breath, seizures, thoughts of hurting yourself  or your baby, bleeding, a painful or swollen leg, fever, or headache (see [employer] POST-BIRTH Warning Signs campaign).  If these happen call 911.  Itching is also not normal in pregnancy and if you experience this, especially over your hands and feet, potentially worse at night, notify your doctors.

## 2024-10-10 DIAGNOSIS — Z3A.21 21 WEEKS GESTATION OF PREGNANCY: ICD-10-CM

## 2024-10-10 DIAGNOSIS — O99.212 OBESITY AFFECTING PREGNANCY IN SECOND TRIMESTER, UNSPECIFIED OBESITY TYPE: ICD-10-CM

## 2024-10-10 DIAGNOSIS — O24.414 INSULIN CONTROLLED GESTATIONAL DIABETES MELLITUS (GDM) IN SECOND TRIMESTER: ICD-10-CM

## 2024-10-10 RX ORDER — INSULIN GLARGINE 100 [IU]/ML
30 INJECTION, SOLUTION SUBCUTANEOUS
Qty: 15 ML | Refills: 0 | Status: SHIPPED | OUTPATIENT
Start: 2024-10-10

## 2024-10-11 ENCOUNTER — ROUTINE PRENATAL (OUTPATIENT)
Facility: HOSPITAL | Age: 29
End: 2024-10-11
Payer: COMMERCIAL

## 2024-10-11 VITALS
WEIGHT: 216.4 LBS | SYSTOLIC BLOOD PRESSURE: 108 MMHG | BODY MASS INDEX: 36.95 KG/M2 | DIASTOLIC BLOOD PRESSURE: 58 MMHG | HEART RATE: 103 BPM | HEIGHT: 64 IN

## 2024-10-11 DIAGNOSIS — Z3A.33 33 WEEKS GESTATION OF PREGNANCY: ICD-10-CM

## 2024-10-11 DIAGNOSIS — O24.414 INSULIN CONTROLLED GESTATIONAL DIABETES MELLITUS (GDM) IN THIRD TRIMESTER: Primary | ICD-10-CM

## 2024-10-11 PROCEDURE — 59025 FETAL NON-STRESS TEST: CPT | Performed by: OBSTETRICS & GYNECOLOGY

## 2024-10-11 NOTE — PROGRESS NOTES
Repeat Non-Stress Testing:    Patient verbalizes +FM. Pt denies ALL:               Leaking of fluid   Contractions   Vaginal bleeding   Decreased fetal movement    Patient is performing daily kick counts. Patient has no questions or concerns.   NST strip reviewed by Dr. Lyn.

## 2024-10-15 ENCOUNTER — ROUTINE PRENATAL (OUTPATIENT)
Facility: HOSPITAL | Age: 29
End: 2024-10-15
Payer: COMMERCIAL

## 2024-10-15 VITALS
BODY MASS INDEX: 37.39 KG/M2 | SYSTOLIC BLOOD PRESSURE: 110 MMHG | HEART RATE: 104 BPM | WEIGHT: 219 LBS | DIASTOLIC BLOOD PRESSURE: 62 MMHG | HEIGHT: 64 IN

## 2024-10-15 DIAGNOSIS — Z3A.34 34 WEEKS GESTATION OF PREGNANCY: Primary | ICD-10-CM

## 2024-10-15 DIAGNOSIS — O24.414 INSULIN CONTROLLED GESTATIONAL DIABETES MELLITUS (GDM) IN THIRD TRIMESTER: ICD-10-CM

## 2024-10-15 PROBLEM — Z34.93 PRENATAL CARE IN THIRD TRIMESTER: Status: ACTIVE | Noted: 2024-10-15

## 2024-10-15 PROCEDURE — 59025 FETAL NON-STRESS TEST: CPT | Performed by: OBSTETRICS & GYNECOLOGY

## 2024-10-15 NOTE — PROGRESS NOTES
Repeat Non-Stress Testing:    Patient verbalizes +FM. Pt denies ALL:               Leaking of fluid   Contractions   Vaginal bleeding   Decreased fetal movement    Patient is performing daily kick counts. Patient has no questions or concerns.   NST strip reviewed by Dr. Ivy.

## 2024-10-15 NOTE — PROGRESS NOTES
OB/GYN  PN Visit  Kimberlyn Wills  [id number]  10/17/2024  12:31 PM  JR Reaves    S: 29 y.o.  34w3d here for PN visit. Pregnancy complicated by tobacco use, A2GDM, polyhydraminos, and BMI 34. History of partial placenta abruption in 2018 and IUGR.    OB complaints:  Denies c/o n/v/ha, no edema, no DV.   No vb/lof  No cramping/ctxns or signs of PTL.    She reports pain in her abdomen to left of her belly button. She reports it is exacerbated with movement, coughing, and abdominal tension. Rating 8-9/10. Sharp, stabbing pain; fades out, but does not require medication.     O:    Pre- Vitals      Flowsheet Row Most Recent Value   Prenatal Assessment    Fetal Heart Rate 155   Fundal Height (cm) 35 cm   Movement Present   Prenatal Vitals    Blood Pressure 112/64   Weight - Scale 99.3 kg (219 lb)   Urine Albumin/Glucose    Dilation/Effacement/Station    Vaginal Drainage    Edema                   Gen: no acute distress, nonlabored breathing.  OB exam completed: fundal height, +FHT.  Urine: -/-    A/P:  Problem List Items Addressed This Visit       Tobacco abuse     She is continuing to reduce consumption. She has switched to nicotine vapes.         Medical marijuana use    History of IUGR (intrauterine growth retardation) and stillbirth, currently pregnant, third trimester    Insulin controlled gestational diabetes mellitus (GDM) in third trimester - Primary    32 weeks gestation of pregnancy    Relevant Orders    influenza vaccine preservative-free 0.5 mL IM (Fluzone, Afluria, Fluarix, Flulaval)    Obesity complicating pregnancy in second trimester     Pre-pregnancy weight 199 lbs. BMI 34.14.  -Current weight 205 lbs. BMI 35.19.   -Recommended weight gain 11 to 20 lbs.  -Continue GDM meal plan and follow with dietitian as needed.          BMI 35.0-35.9,adult    Decreased fetal movements in third trimester    Uterine contractions    Polyhydramnios in third trimester     Continue AFPS         Prenatal  care in third trimester     Labor precautions reviewed  Fetal movement reviewed  Labs UTD.   RH+  RSV: 10/3/24  Flu: given today  Breast feeding: yes, pump ordered today  Pediatrician: established  Contraception: POP to MILLY  Tdap: received  30 week packet provided and consent in media.  US: 28 weeks: EFW Hadlock 4 1194 grams - 2 lbs 10 oz (25%)   Next Growth US: 10/18/24.  GBS: at 36 weeks         Abdominal pain during pregnancy in third trimester     Physical exam normal.  Recommend supportive bands/belly bands.  Discussed suspicion of MSK origin for pain. Advised to notify MFM at tomorrows growth scan to evaluate the placenta.               RTC in 2 weeks    JR Reaves  10/17/2024  12:31 PM

## 2024-10-15 NOTE — PATIENT INSTRUCTIONS
Thank you for choosing us for your  care today.  If you have any questions about your ultrasound or care, please do not hesitate to contact us or your primary obstetrician.        Some general instructions for your pregnancy are:    Exercise: Aim for 150 minutes per week of regular exercise.  Walking is great!  Nutrition: Choose healthy sources of calcium, iron, and protein.  Avoid ultraprocessed foods and added sugar.  Learn about Preeclampsia: preeclampsia is a common, potentially serious high blood pressure complication in pregnancy.  A blood pressure of 140mmHg (systolic or top number) or 90mmHg (diastolic or bottom number) should be evaluated by your doctor.  Aspirin is sometimes prescribed in early pregnancy to prevent preeclampsia in women with risk factors - ask your obstetrician if you should be on this medication.  For more resources, visit:  https://www.highriskpregnancyinfo.org/preeclampsia  If you smoke, please try to quit completely but also try to reduce your smoking by as much as possible (as soon as possible).  Do not vape.  Please also avoid cannabis products.  Other warning signs to watch out for in pregnancy or postpartum: chest pain, obstructed breathing or shortness of breath, seizures, thoughts of hurting yourself or your baby, bleeding, a painful or swollen leg, fever, or headache (see [facility]).  If these happen call 911.  Itching is also not normal in pregnancy and if you experience this, especially over your hands and feet, potentially worse at night, notify your doctors.     Kick Counts in Pregnancy   AMBULATORY CARE:   Kick counts  measure how much your baby is moving in your womb. A kick from your baby can be felt as a twist, turn, swish, roll, or jab. It is common to feel your baby kicking at 26 to 28 weeks of pregnancy. You may feel your baby kick as early as 20 weeks of pregnancy. You may want to start counting at 28 weeks.   Contact your  doctor immediately if:   You feel a change in the number of kicks or movements of your baby.      You feel fewer than 10 kicks within 2 hours.      You have questions or concerns about your baby's movements.     Why measure kick counts:  Your baby's movement may provide information about your baby's health. He or she may move less, or not at all, if there are problems. Your baby may move less if he or she is not getting enough oxygen or nutrition from the placenta. Do not smoke while you are pregnant. Smoking decreases the amount of oxygen that gets to your baby. Talk to your healthcare provider if you need help to quit smoking. Tell your healthcare provider as soon as you feel a change in your baby's movements.  When to measure kick counts:   Measure kick counts at the same time every day.       Measure kick counts when your baby is awake and most active. Your baby may be most active in the evening.     How to measure kick counts:  Check that your baby is awake before you measure kick counts. You can wake up your baby by lightly pushing on your belly, walking, or drinking something cold. Your healthcare provider may tell you different ways to measure kick counts. You may be told to do the following:  Use a chart or clock to keep track of the time you start and finish counting.      Sit in a chair or lie on your left side.      Place your hands on the largest part of your belly.      Count until you reach 10 kicks. Write down how much time it takes to count 10 kicks.      It may take 30 minutes to 2 hours to count 10 kicks. It should not take more than 2 hours to count 10 kicks.     Follow up with your doctor as directed:  Write down your questions so you remember to ask them during your visits.   © Copyright [employer] 2023 Information is for End User's use only and may not be sold, redistributed or otherwise used for commercial purposes.  The above information is an  only. It is not intended as  medical advice for individual conditions or treatments. Talk to your doctor, nurse or pharmacist before following any medical regimen to see if it is safe and effective for you. Thank you for choosing us for your  care today.  If you have any questions about your ultrasound or care, please do not hesitate to contact us or your primary obstetrician.        Some general instructions for your pregnancy are:    Exercise: Aim for 150 minutes per week of regular exercise.  Walking is great!  Nutrition: Choose healthy sources of calcium, iron, and protein.  Avoid ultraprocessed foods and added sugar.  Learn about Preeclampsia: preeclampsia is a common, potentially serious high blood pressure complication in pregnancy.  A blood pressure of 140mmHg (systolic or top number) or 90mmHg (diastolic or bottom number) should be evaluated by your doctor.  Aspirin is sometimes prescribed in early pregnancy to prevent preeclampsia in women with risk factors - ask your obstetrician if you should be on this medication.  For more resources, visit:  https://www.highriskpregnancyinfo.org/preeclampsia  If you smoke, please try to quit completely but also try to reduce your smoking by as much as possible (as soon as possible).  Do not vape.  Please also avoid cannabis products.  Other warning signs to watch out for in pregnancy or postpartum: chest pain, obstructed breathing or shortness of breath, seizures, thoughts of hurting yourself or your baby, bleeding, a painful or swollen leg, fever, or headache (see [facility] POST-BIRTH Warning Signs campaign).  If these happen call 911.  Itching is also not normal in pregnancy and if you experience this, especially over your hands and feet, potentially worse at night, notify your doctors.

## 2024-10-15 NOTE — ASSESSMENT & PLAN NOTE
Pre-pregnancy weight 199 lbs. BMI 34.14.  -Current weight 205 lbs. BMI 35.19.   -Recommended weight gain 11 to 20 lbs.  -Continue GDM meal plan and follow with dietitian as needed.

## 2024-10-15 NOTE — ASSESSMENT & PLAN NOTE
Labor precautions reviewed  Fetal movement reviewed  Labs UTD.   RH+  RSV: 10/3/24  Flu: given today  Breast feeding: yes, pump ordered today  Pediatrician: established  Contraception: POP to MILLY  Tdap: received  30 week packet provided and consent in media.  US: 28 weeks: EFW Hadlock 4 1194 grams - 2 lbs 10 oz (25%)   Next Growth US: 10/18/24.  GBS: at 36 weeks

## 2024-10-16 NOTE — PATIENT INSTRUCTIONS
Kick Counts in Pregnancy   AMBULATORY CARE:   Kick counts  measure how much your baby is moving in your womb. A kick from your baby can be felt as a twist, turn, swish, roll, or jab. It is common to feel your baby kicking at 26 to 28 weeks of pregnancy. You may feel your baby kick as early as 20 weeks of pregnancy. You may want to start counting at 28 weeks.   Contact your doctor immediately if:   You feel a change in the number of kicks or movements of your baby.      You feel fewer than 10 kicks within 2 hours.      You have questions or concerns about your baby's movements.     Why measure kick counts:  Your baby's movement may provide information about your baby's health. He or she may move less, or not at all, if there are problems. Your baby may move less if he or she is not getting enough oxygen or nutrition from the placenta. Do not smoke while you are pregnant. Smoking decreases the amount of oxygen that gets to your baby. Talk to your healthcare provider if you need help to quit smoking. Tell your healthcare provider as soon as you feel a change in your baby's movements.  When to measure kick counts:   Measure kick counts at the same time every day.       Measure kick counts when your baby is awake and most active. Your baby may be most active in the evening.     How to measure kick counts:  Check that your baby is awake before you measure kick counts. You can wake up your baby by lightly pushing on your belly, walking, or drinking something cold. Your healthcare provider may tell you different ways to measure kick counts. You may be told to do the following:  Use a chart or clock to keep track of the time you start and finish counting.      Sit in a chair or lie on your left side.      Place your hands on the largest part of your belly.      Count until you reach 10 kicks. Write down how much time it takes to count 10 kicks.      It may take 30 minutes to 2 hours to count 10 kicks. It should not take more  than 2 hours to count 10 kicks.     Follow up with your doctor as directed:  Write down your questions so you remember to ask them during your visits.   ©  [employer]2023 Information is for End User's use only and may not be sold, redistributed or otherwise used for commercial purposes.  The above information is an  only. It is not intended as medical advice for individual conditions or treatments. Talk to your doctor, nurse or pharmacist before following any medical regimen to see if it is safe and effective for you. Thank you for choosing us for your  care today.  If you have any questions about your ultrasound or care, please do not hesitate to contact us or your primary obstetrician.        Some general instructions for your pregnancy are:    Exercise: Aim for 150 minutes per week of regular exercise.  Walking is great!  Nutrition: Choose healthy sources of calcium, iron, and protein.  Avoid ultraprocessed foods and added sugar.  Learn about Preeclampsia: preeclampsia is a common, potentially serious high blood pressure complication in pregnancy.  A blood pressure of 140mmHg (systolic or top number) or 90mmHg (diastolic or bottom number) should be evaluated by your doctor.  Aspirin is sometimes prescribed in early pregnancy to prevent preeclampsia in women with risk factors - ask your obstetrician if you should be on this medication.  For more resources, visit:  https://www.highriskpregnancyinfo.org/preeclampsia  If you smoke, please try to quit completely but also try to reduce your smoking by as much as possible (as soon as possible).  Do not vape.  Please also avoid cannabis products.  Other warning signs to watch out for in pregnancy or postpartum: chest pain, obstructed breathing or shortness of breath, seizures, thoughts of hurting yourself or your baby, bleeding, a painful or swollen leg, fever, or headache (see [facility] POST-BIRTH Warning Signs campaign).  If these happen  call 911.  Itching is also not normal in pregnancy and if you experience this, especially over your hands and feet, potentially worse at night, notify your doctors.       Nonstress Test for Pregnancy   WHAT YOU NEED TO KNOW:   What do I need to know about a nonstress test?  A nonstress test measures your baby's heart rate and movements. Nonstress means that no stress will be placed on your baby during the test.  How do I prepare for a nonstress test?  Your healthcare provider will talk to you about how to prepare for this test. Your provider may tell you to eat and drink plenty of liquids before your test. If you smoke, you may be asked not to smoke within 2 hours before the test. Your provider will also tell you which medicines to take or not take on the day of your test.  What will happen during a nonstress test?  You may be asked to lie down or recline back for the test on a bed. One or 2 belts with sensors will be placed around your abdomen. Your baby's heart rate will be recorded with a machine. If your baby does not move, your baby may be asleep. Your healthcare provider may make a noise near your abdomen to try to wake your baby. The test usually takes about 20 minutes, but can take longer if your baby needs to be awakened.        What do I need to know about the test results?  Your baby will be expected to move at least 2 times for a certain amount of time. Your baby's heart rate will be expected to go up by a certain number of beats per minute during movement. If your baby does not move as expected, the test may need to be repeated or you may need other tests.  CARE AGREEMENT:   You have the right to help plan your care. Learn about your health condition and how it may be treated. Discuss treatment options with your healthcare providers to decide what care you want to receive. You always have the right to refuse treatment. The above information is an  only. It is not intended as medical  advice for individual conditions or treatments. Talk to your doctor, nurse or pharmacist before following any medical regimen to see if it is safe and effective for you.  © Copyright [employer] 2023 Information is for End User's use only and may not be sold, redistributed or otherwise used for commercial purposes.     Kick Counts in Pregnancy   AMBULATORY CARE:   Kick counts  measure how much your baby is moving in your womb. A kick from your baby can be felt as a twist, turn, swish, roll, or jab. It is common to feel your baby kicking at 26 to 28 weeks of pregnancy. You may feel your baby kick as early as 20 weeks of pregnancy. You may want to start counting at 28 weeks.   Contact your doctor immediately if:   You feel a change in the number of kicks or movements of your baby.      You feel fewer than 10 kicks within 2 hours.      You have questions or concerns about your baby's movements.     Why measure kick counts:  Your baby's movement may provide information about your baby's health. He or she may move less, or not at all, if there are problems. Your baby may move less if he or she is not getting enough oxygen or nutrition from the placenta. Do not smoke while you are pregnant. Smoking decreases the amount of oxygen that gets to your baby. Talk to your healthcare provider if you need help to quit smoking. Tell your healthcare provider as soon as you feel a change in your baby's movements.  When to measure kick counts:   Measure kick counts at the same time every day.       Measure kick counts when your baby is awake and most active. Your baby may be most active in the evening.     How to measure kick counts:  Check that your baby is awake before you measure kick counts. You can wake up your baby by lightly pushing on your belly, walking, or drinking something cold. Your healthcare provider may tell you different ways to measure kick counts. You may be told to do the following:  Use a chart or clock to keep track  of the time you start and finish counting.      Sit in a chair or lie on your left side.      Place your hands on the largest part of your belly.      Count until you reach 10 kicks. Write down how much time it takes to count 10 kicks.      It may take 30 minutes to 2 hours to count 10 kicks. It should not take more than 2 hours to count 10 kicks.     Follow up with your doctor as directed:  Write down your questions so you remember to ask them during your visits.   © Copyright [employer] 2023 Information is for End User's use only and may not be sold, redistributed or otherwise used for commercial purposes.  The above information is an  only. It is not intended as medical advice for individual conditions or treatments. Talk to your doctor, nurse or pharmacist before following any medical regimen to see if it is safe and effective for you.

## 2024-10-17 ENCOUNTER — ROUTINE PRENATAL (OUTPATIENT)
Dept: OBGYN CLINIC | Facility: CLINIC | Age: 29
End: 2024-10-17
Payer: COMMERCIAL

## 2024-10-17 ENCOUNTER — TREATMENT (OUTPATIENT)
Dept: PERINATAL CARE | Facility: CLINIC | Age: 29
End: 2024-10-17

## 2024-10-17 VITALS
DIASTOLIC BLOOD PRESSURE: 64 MMHG | SYSTOLIC BLOOD PRESSURE: 112 MMHG | WEIGHT: 219 LBS | HEIGHT: 64 IN | BODY MASS INDEX: 37.39 KG/M2

## 2024-10-17 DIAGNOSIS — O09.293 HISTORY OF IUGR (INTRAUTERINE GROWTH RETARDATION) AND STILLBIRTH, CURRENTLY PREGNANT, THIRD TRIMESTER: ICD-10-CM

## 2024-10-17 DIAGNOSIS — Z79.899 MEDICAL MARIJUANA USE: ICD-10-CM

## 2024-10-17 DIAGNOSIS — O24.414 INSULIN CONTROLLED GESTATIONAL DIABETES MELLITUS (GDM) IN THIRD TRIMESTER: Primary | ICD-10-CM

## 2024-10-17 DIAGNOSIS — O40.3XX0 POLYHYDRAMNIOS IN THIRD TRIMESTER COMPLICATION, SINGLE OR UNSPECIFIED FETUS: ICD-10-CM

## 2024-10-17 DIAGNOSIS — O99.212 OBESITY AFFECTING PREGNANCY IN SECOND TRIMESTER, UNSPECIFIED OBESITY TYPE: ICD-10-CM

## 2024-10-17 DIAGNOSIS — O36.8130 DECREASED FETAL MOVEMENTS IN THIRD TRIMESTER, SINGLE OR UNSPECIFIED FETUS: ICD-10-CM

## 2024-10-17 DIAGNOSIS — Z72.0 TOBACCO ABUSE: ICD-10-CM

## 2024-10-17 DIAGNOSIS — R10.9 ABDOMINAL PAIN DURING PREGNANCY IN THIRD TRIMESTER: ICD-10-CM

## 2024-10-17 DIAGNOSIS — O26.893 ABDOMINAL PAIN DURING PREGNANCY IN THIRD TRIMESTER: ICD-10-CM

## 2024-10-17 DIAGNOSIS — Z3A.32 32 WEEKS GESTATION OF PREGNANCY: ICD-10-CM

## 2024-10-17 DIAGNOSIS — O47.9 UTERINE CONTRACTIONS: ICD-10-CM

## 2024-10-17 DIAGNOSIS — Z34.93 PRENATAL CARE IN THIRD TRIMESTER: ICD-10-CM

## 2024-10-17 PROCEDURE — PNV

## 2024-10-17 PROCEDURE — 90471 IMMUNIZATION ADMIN: CPT

## 2024-10-17 PROCEDURE — 90656 IIV3 VACC NO PRSV 0.5 ML IM: CPT

## 2024-10-17 NOTE — ASSESSMENT & PLAN NOTE
Physical exam normal.  Recommend supportive bands/belly bands.  Discussed suspicion of MSK origin for pain. Advised to notify MFM at tomorrows growth scan to evaluate the placenta.

## 2024-10-17 NOTE — PROGRESS NOTES
"Blood Sugar Log  Method of Reporting: ProHatch Glucose Flowsheet   Date: 10/17/24    Patient: Kimberlyn Wills  : 1995    Estimated Date of Delivery: 24  GA: 34w3d   OB/GYN: [facility]    DX; Insulin controled gestational diabtes    Reason for Documentation: Insulin controlled gestational diabetes     ASSESSMENT - REVIEW OF BG LOG         Assessment:  FBG:  Beginning to increase again  PPBG: Overall Controlled <120mg/dl    PLAN     MEDICATIONS:     Lantus--Increase from 30 to 34 units at at bedtime (9pm)    DIET:   Continue Assigned Meal Plan (including 3 meals and 3 snacks): 2000 calorie (CHO: 45-15-60-15-60-30) (PRO: 2/3-1-3/4-1-3/4-2)    BLOOD SUGAR MONITORING: (Glucometer: OneTouch Verio Flex)  Continue Testing B x per day (Fasting, 2 hour after start of each meal)    PHYSICAL ACTIVITY:  Continue walking (or other preferred physical activity) daily - recommend at least 20-30 minutes daily, preferably after dinner if able (unless otherwise instructed per OB)  Walks daily    MONITORING     EDUCATION: (Diabetes and Pregnancy Program)    Completed: Class 1 (Pt Goal: \"I will eat 3 meals and 3 snacks each day, including protein at each\"), Class 2, and Insulin Education    Needs Scheduled: None at this time, Follow-ups to be scheduled as indicated per weekly review of blood sugar logs    WEIGHT:     Pre-Gravid Wt Pre-Gravid BMI TWG   90.3 kg (199 lb) 34.14 9.072 kg (20 lb)     FETAL MONITORITNG - ULTRASOUNDS  Recent Ultrasounds Findings: NML Growth/ERNESTO - 24  US Follow-Ups: Scheduled Appropriately, 10/1/24  Further Fetal Surveillance: Beginning at 32 weeks (NST twice weekly + ERNESTO once a week)    LABS  Lab Results   Component Value Date/Time    [id number] 187 (H) 2024 11:42 AM    GLUF 193 (H) 2024 11:42 AM    HGBA1C 5.1 2024 10:41 AM    HGBA1C 5.3 2024 10:49 AM     Active Orders (needing to be collected):  None    Diabetes Outside Support System: Spouse/Family  Goal " Achieved: On track    Date to report next: Monday, 10/21/24    Pham Oakley MS, RD< [facility]  Diabetes and Pregnancy Program   Maternal Fetal Medicine  [facility]

## 2024-10-17 NOTE — PROGRESS NOTES
Flu vaccine administered in right deltoid per pt request. Pt tolerated well. No previous history of adverse reactions. Pt given VIS at time of administration.   Naz MORENO

## 2024-10-18 ENCOUNTER — ULTRASOUND (OUTPATIENT)
Dept: PERINATAL CARE | Facility: CLINIC | Age: 29
End: 2024-10-18
Payer: COMMERCIAL

## 2024-10-18 VITALS
WEIGHT: 219.6 LBS | BODY MASS INDEX: 37.49 KG/M2 | HEART RATE: 111 BPM | HEIGHT: 64 IN | SYSTOLIC BLOOD PRESSURE: 114 MMHG | DIASTOLIC BLOOD PRESSURE: 70 MMHG

## 2024-10-18 DIAGNOSIS — O24.414 INSULIN CONTROLLED GESTATIONAL DIABETES MELLITUS (GDM) IN THIRD TRIMESTER: ICD-10-CM

## 2024-10-18 DIAGNOSIS — O99.212 OBESITY AFFECTING PREGNANCY IN SECOND TRIMESTER, UNSPECIFIED OBESITY TYPE: ICD-10-CM

## 2024-10-18 DIAGNOSIS — O40.3XX0 POLYHYDRAMNIOS IN THIRD TRIMESTER COMPLICATION, SINGLE OR UNSPECIFIED FETUS: ICD-10-CM

## 2024-10-18 DIAGNOSIS — Z36.89 ENCOUNTER FOR ULTRASOUND TO ASSESS FETAL GROWTH: ICD-10-CM

## 2024-10-18 DIAGNOSIS — O09.293 HISTORY OF IUGR (INTRAUTERINE GROWTH RETARDATION) AND STILLBIRTH, CURRENTLY PREGNANT, THIRD TRIMESTER: ICD-10-CM

## 2024-10-18 DIAGNOSIS — Z3A.34 34 WEEKS GESTATION OF PREGNANCY: Primary | ICD-10-CM

## 2024-10-18 PROBLEM — O36.8130 DECREASED FETAL MOVEMENTS IN THIRD TRIMESTER: Status: RESOLVED | Noted: 2024-09-25 | Resolved: 2024-10-18

## 2024-10-18 PROCEDURE — 59025 FETAL NON-STRESS TEST: CPT | Performed by: OBSTETRICS & GYNECOLOGY

## 2024-10-18 PROCEDURE — 76816 OB US FOLLOW-UP PER FETUS: CPT | Performed by: OBSTETRICS & GYNECOLOGY

## 2024-10-18 PROCEDURE — 99214 OFFICE O/P EST MOD 30 MIN: CPT | Performed by: OBSTETRICS & GYNECOLOGY

## 2024-10-18 NOTE — LETTER
"  Date: 10/18/2024    Mini Stevens MD  [id number] [facility] [id number]    Patient: Kimberlyn Wills   YOB: 1995   Date of Visit: 10/18/2024   Gestational age 34w4d   Nature of this communication: Routine       This patient was seen recently in our  office.  Please see ultrasound report under \"OB Procedures\" tab.  Please don't hesitate to contact our office with any concerns or questions.      Sincerely,      Chari Yeung MD  Attending Physician, Maternal-Fetal Medicine  [facility]       "

## 2024-10-18 NOTE — PROGRESS NOTES
Repeat Non-Stress Testing:    Patient verbalizes +FM. Pt denies ALL:               Leaking of fluid   Contractions   Vaginal bleeding   Decreased fetal movement    Patient is performing daily kick counts. Patient has no questions or concerns.   NST strip reviewed by Dr. Yeung.

## 2024-10-21 ENCOUNTER — ULTRASOUND (OUTPATIENT)
Facility: HOSPITAL | Age: 29
End: 2024-10-21
Payer: COMMERCIAL

## 2024-10-21 VITALS
WEIGHT: 221 LBS | HEIGHT: 64 IN | DIASTOLIC BLOOD PRESSURE: 70 MMHG | HEART RATE: 104 BPM | BODY MASS INDEX: 37.73 KG/M2 | SYSTOLIC BLOOD PRESSURE: 116 MMHG

## 2024-10-21 DIAGNOSIS — O40.3XX0 POLYHYDRAMNIOS IN THIRD TRIMESTER COMPLICATION, SINGLE OR UNSPECIFIED FETUS: Primary | ICD-10-CM

## 2024-10-21 DIAGNOSIS — Z3A.35 35 WEEKS GESTATION OF PREGNANCY: ICD-10-CM

## 2024-10-21 PROCEDURE — 76818 FETAL BIOPHYS PROFILE W/NST: CPT | Performed by: OBSTETRICS & GYNECOLOGY

## 2024-10-23 PROBLEM — Z3A.35 35 WEEKS GESTATION OF PREGNANCY: Status: ACTIVE | Noted: 2024-06-20

## 2024-10-24 ENCOUNTER — ROUTINE PRENATAL (OUTPATIENT)
Facility: HOSPITAL | Age: 29
End: 2024-10-24
Payer: COMMERCIAL

## 2024-10-24 VITALS
HEART RATE: 112 BPM | SYSTOLIC BLOOD PRESSURE: 108 MMHG | BODY MASS INDEX: 37.56 KG/M2 | HEIGHT: 64 IN | WEIGHT: 220 LBS | DIASTOLIC BLOOD PRESSURE: 58 MMHG

## 2024-10-24 DIAGNOSIS — Z3A.35 35 WEEKS GESTATION OF PREGNANCY: Primary | ICD-10-CM

## 2024-10-24 DIAGNOSIS — O24.414 INSULIN CONTROLLED GESTATIONAL DIABETES MELLITUS (GDM) IN THIRD TRIMESTER: ICD-10-CM

## 2024-10-24 PROCEDURE — 59025 FETAL NON-STRESS TEST: CPT | Performed by: OBSTETRICS & GYNECOLOGY

## 2024-10-24 NOTE — PROGRESS NOTES
Repeat Non-Stress Testing:    Patient verbalizes +FM. Pt denies ALL:               Leaking of fluid   Contractions   Vaginal bleeding   Decreased fetal movement    PTL precautions reviewed with patient.     Patient is performing daily kick counts. Patient has no questions or concerns.   NST strip reviewed by Dr. Yeung.

## 2024-10-29 ENCOUNTER — ROUTINE PRENATAL (OUTPATIENT)
Facility: HOSPITAL | Age: 29
End: 2024-10-29
Payer: COMMERCIAL

## 2024-10-29 VITALS
DIASTOLIC BLOOD PRESSURE: 60 MMHG | HEART RATE: 89 BPM | WEIGHT: 221 LBS | HEIGHT: 64 IN | BODY MASS INDEX: 37.73 KG/M2 | SYSTOLIC BLOOD PRESSURE: 108 MMHG

## 2024-10-29 DIAGNOSIS — O24.414 INSULIN CONTROLLED GESTATIONAL DIABETES MELLITUS (GDM) IN THIRD TRIMESTER: ICD-10-CM

## 2024-10-29 DIAGNOSIS — Z3A.36 36 WEEKS GESTATION OF PREGNANCY: Primary | ICD-10-CM

## 2024-10-29 PROCEDURE — 59025 FETAL NON-STRESS TEST: CPT | Performed by: OBSTETRICS & GYNECOLOGY

## 2024-10-29 NOTE — PROGRESS NOTES
Repeat Non-Stress Testing:    Patient verbalizes +FM. Pt denies ALL:               Leaking of fluid   Contractions   Vaginal bleeding   Decreased fetal movement    Patient is performing daily kick counts. Patient has no questions or concerns.   NST strip reviewed by Dr. Boudreaux.

## 2024-10-30 PROBLEM — Z3A.36 36 WEEKS GESTATION OF PREGNANCY: Status: ACTIVE | Noted: 2024-06-20

## 2024-10-30 NOTE — PROGRESS NOTES
OB/GYN  PN Visit  Kimberlyn Wills  [id number]  2024  9:26 AM  JR Diaz      S: 29 y.o.  36w2d here for PN visit.   She denies contractions. She denies leakage of fluid and vaginal bleeding.   She feel good fetal movement.   She denies nausea (-), vomiting (-), headache (-), cramping (-), edema (+),   Her pregnancy is complicated by tobacco use, A2GDM, bipolar disorder, polyhydramnios, BMI 34, history of partial placental abruption and IUGR in previous pregnancy in 2018.   She is currently receiving biweekly antepartum surveillance with [facility].  Has appointment today.   She is currently on Lantus for diabetes management and has been compliant with managing blood sugars.     O:  Pre-Trisha Vitals      Flowsheet Row Most Recent Value   Prenatal Assessment    Fetal Heart Rate 128   Fundal Height (cm) 39 cm   Movement Present   Prenatal Vitals    Blood Pressure 100/60   Weight - Scale 101 kg (222 lb)   Urine Albumin/Glucose    Dilation/Effacement/Station    Vaginal Drainage    Edema    LLE Edema Non-pitting   RLE Edema Non-pitting   Facial Edema None          Yh=668 kg (222 lb); Body mass index is 38.11 kg/m².; TWG=10.4 kg (23 lb)    Physical Exam  General: Well appearing, no distress  Respiratory: Unlabored breathing  Cardiovascular: Regular rate.  Abdomen: Soft, gravid, nontender  Fundal Height: Appropriate for gestational age.  Extremities: Warm and well perfused.  Non tender.  OB exam completed: fundal height, +FHT.  Urine: -/-     A/P:  1. 36w2d GESTATION  - Continue PNV  - Labor precautions reviewed  - Fetal kick counts reviewed  - Labs: UTD  - Pap:  NILM GC/CT: negative  - GBS: collected today 24  - Genetics: NIPS low risk, AFP negative  - Ultrasounds: 10/18/24 - fetal growth is normal, polyhydramnios,   - Tdap: received  - Flu Shot: given 10/17/24  - RSV: 10/3/2024 given  - Rhogam: O+, Rhogam not indicated.   - Yellow packet and delivery consent: signed 10/3/24.   - Delivery:   [facility]  - Contraception: POP   - Breastfeeding: yes, pump ordered  - Pediatrician: established    Reviewed common discomforts of pregnancy in third trimester and warning signs.  Reviewed labor precautions and when to seek immediate attention.   Reviewed fetal kick counts obtaining 10 kicks in two hours.   Advised to start perineal massage starting at 34 weeks to decrease the risk of vaginal tearing.   Advised to continue medications PNV and return in 1 weeks.    Problem List       ASCUS of cervix with negative high risk HPV    Overview     Formatting of this note might be different from the original.   Pap 12/7/17         Bipolar 2 disorder, major depressive episode (HCC) (Chronic)    Overview     Last Assessment & Plan:    Formatting of this note might be different from the original.   Patient describes hypomanic symptoms emerging. Weight gain with Zyprexa. Discussed switching to different SGA vs augmenting with metformin. Perhaps SSRI could be contributing to mood instability.       -Increase Zyprexa from 5mg nightly to 7.5mg nightly for mood stabilization.    -Continue Prozac 20mg daily for depression and anxiety. Can be increased at next appointment if needed.    -Trial of melatonin 3-5mg QHS to help stabilize circadian rhythm   -Risks, benefits, alternative treatments and potential side effects of all medications were discussed with the patient who verbalized understanding.  All questions were answered.   -The patient is not an acute danger to themself or others at this time   -Return to clinic in 1 month         Dislocation of right patella    Generalized anxiety disorder (Chronic)    Overview     Formatting of this note might be different from the original.   stopped medication 2015      Last Assessment & Plan:    Formatting of this note might be different from the original.   Patient describes hypomanic symptoms emerging. Weight gain with Zyprexa. Discussed switching to different SGA vs  augmenting with metformin. Perhaps SSRI could be contributing to mood instability.       -Increase Zyprexa from 5mg nightly to 7.5mg nightly for mood stabilization.    -Continue Prozac 20mg daily for depression and anxiety. Can be increased at next appointment if needed.    -Trial of melatonin 3-5mg QHS to help stabilize circadian rhythm   -Risks, benefits, alternative treatments and potential side effects of all medications were discussed with the patient who verbalized understanding.  All questions were answered.   -The patient is not an acute danger to themself or others at this time   -Return to clinic in 1 month    stopped medication 2015         History of drug use    Incomplete RBBB    Osteochondral defect of femoral condyle    Seasonal allergies    Tobacco abuse    Overview     Last Assessment & Plan:    Formatting of this note might be different from the original.   She has switched to nicotine vaporizer instead of cigarettes; still trying to cut back on vape use.         Medical marijuana use    Overview     During pregnancy         History of IUGR (intrauterine growth retardation) and stillbirth, currently pregnant, third trimester    Insulin controlled gestational diabetes mellitus (GDM) in third trimester    36 weeks gestation of pregnancy    Obesity complicating pregnancy in second trimester    Overview     -Pre-pregnancy weight 199 lbs. BMI 34.14.  -Current weight 205 lbs. BMI 35.19.   -Recommended weight gain 11 to 20 lbs.  -Continue GDM meal plan and follow with dietitian as needed.          BMI 35.0-35.9,adult    Uterine contractions    Polyhydramnios in third trimester    Prenatal care in third trimester    Abdominal pain during pregnancy in third trimester       JR Diaz  11/1/2024  9:26 AM

## 2024-11-01 ENCOUNTER — ROUTINE PRENATAL (OUTPATIENT)
Dept: OBGYN CLINIC | Facility: CLINIC | Age: 29
End: 2024-11-01

## 2024-11-01 ENCOUNTER — ULTRASOUND (OUTPATIENT)
Facility: HOSPITAL | Age: 29
End: 2024-11-01
Payer: COMMERCIAL

## 2024-11-01 ENCOUNTER — TELEPHONE (OUTPATIENT)
Dept: OBGYN CLINIC | Facility: CLINIC | Age: 29
End: 2024-11-01

## 2024-11-01 VITALS
HEART RATE: 88 BPM | BODY MASS INDEX: 38.04 KG/M2 | SYSTOLIC BLOOD PRESSURE: 108 MMHG | DIASTOLIC BLOOD PRESSURE: 58 MMHG | HEIGHT: 64 IN | WEIGHT: 222.8 LBS

## 2024-11-01 VITALS
DIASTOLIC BLOOD PRESSURE: 60 MMHG | HEIGHT: 64 IN | SYSTOLIC BLOOD PRESSURE: 100 MMHG | WEIGHT: 222 LBS | BODY MASS INDEX: 37.9 KG/M2

## 2024-11-01 DIAGNOSIS — Z34.93 THIRD TRIMESTER PREGNANCY: Primary | ICD-10-CM

## 2024-11-01 DIAGNOSIS — Z3A.36 36 WEEKS GESTATION OF PREGNANCY: Primary | ICD-10-CM

## 2024-11-01 DIAGNOSIS — O24.414 INSULIN CONTROLLED GESTATIONAL DIABETES MELLITUS (GDM) IN THIRD TRIMESTER: ICD-10-CM

## 2024-11-01 DIAGNOSIS — O40.3XX0 POLYHYDRAMNIOS IN THIRD TRIMESTER COMPLICATION, SINGLE OR UNSPECIFIED FETUS: ICD-10-CM

## 2024-11-01 DIAGNOSIS — O41.8X90 CHORIOAMNIOTIC SEPARATION, ANTEPARTUM: ICD-10-CM

## 2024-11-01 DIAGNOSIS — Z3A.36 36 WEEKS GESTATION OF PREGNANCY: ICD-10-CM

## 2024-11-01 LAB — EXTERNAL GROUP B STREP ANTIGEN: NORMAL

## 2024-11-01 PROCEDURE — 87150 DNA/RNA AMPLIFIED PROBE: CPT

## 2024-11-01 PROCEDURE — 76818 FETAL BIOPHYS PROFILE W/NST: CPT | Performed by: OBSTETRICS & GYNECOLOGY

## 2024-11-01 PROCEDURE — PNV

## 2024-11-01 PROCEDURE — 99214 OFFICE O/P EST MOD 30 MIN: CPT | Performed by: OBSTETRICS & GYNECOLOGY

## 2024-11-01 NOTE — TELEPHONE ENCOUNTER
Placed call to patient, reviewed/confirmed IOL details and instructions. Patient verbalized understanding and had no questions at this time. Patient aware to call office with any questions or concerns over the weekend.

## 2024-11-01 NOTE — TELEPHONE ENCOUNTER
Placed call to L&D, spoke to Michelle ANDREW and then transferred to collette Arita RN at L&D d/t limited scheduling availability. Requested PM for ripening, patient recommended to delivery at 37w0d per MFM. Per collette Arita RN at L&D, d/t limited ripening availability, patient to be scheduled for medically indicated IOL on Monday 11/4/24 at 0600. Pink sticky and calendar updated. Staff message sent to provider.

## 2024-11-01 NOTE — PROGRESS NOTES
Kimberlyn presents today for  surveillance secondary to polyhydramnios.  Today's polyhydramnios is consistent with moderate polyhydramnios which is increased from previous evaluation.  Also of note is chorioamniotic separation.    Kimberlyn indicates she has no vaginal bleeding or regular contractions.  She has good fetal movement.  Biophysical profile was 10/10.    We discussed the increased risks associated with chorioamniotic separation in the third trimester and I recommend delivery at 37 weeks gestation.  I communicated today's findings and recommendations to both the patient and Dr. Armenta who will coordinate induction of labor on Monday.    We discussed the importance of fetal kick counts and no further ultrasounds were scheduled.    Thank you very much for allowing us to participate in the care of this very nice patient.  Should you have any questions, please do not hesitate to contact our office.

## 2024-11-01 NOTE — TELEPHONE ENCOUNTER
Received: Today  Hoa Armenta MD  P Ob Navigator [facility]    This patient was just dx with chorioamnion separation w/ a history of prior abruption, recommendation is delivery at 37 weeks. Can you please coordinate IOL for 37 weeks?

## 2024-11-02 ENCOUNTER — ANESTHESIA (INPATIENT)
Dept: ANESTHESIOLOGY | Facility: HOSPITAL | Age: 29
End: 2024-11-02
Payer: COMMERCIAL

## 2024-11-02 ENCOUNTER — HOSPITAL ENCOUNTER (INPATIENT)
Facility: HOSPITAL | Age: 29
LOS: 3 days | Discharge: HOME | End: 2024-11-05
Attending: OBSTETRICS & GYNECOLOGY | Admitting: OBSTETRICS & GYNECOLOGY
Payer: COMMERCIAL

## 2024-11-02 ENCOUNTER — NURSE TRIAGE (OUTPATIENT)
Dept: OTHER | Facility: OTHER | Age: 29
End: 2024-11-02

## 2024-11-02 ENCOUNTER — ANESTHESIA EVENT (INPATIENT)
Dept: ANESTHESIOLOGY | Facility: HOSPITAL | Age: 29
End: 2024-11-02
Payer: COMMERCIAL

## 2024-11-02 DIAGNOSIS — Z3A.36 36 WEEKS GESTATION OF PREGNANCY: ICD-10-CM

## 2024-11-02 LAB
ABO GROUP BLD: NORMAL
BLD GP AB SCN SERPL QL: NEGATIVE
ERYTHROCYTE [DISTWIDTH] IN BLOOD BY AUTOMATED COUNT: 12.5 % (ref 11.6–15.1)
GLUCOSE SERPL-MCNC: 100 MG/DL (ref 65–140)
GLUCOSE SERPL-MCNC: 132 MG/DL (ref 65–140)
GLUCOSE SERPL-MCNC: 84 MG/DL (ref 65–140)
HCT VFR BLD AUTO: 33.6 % (ref 34.8–46.1)
HGB BLD-MCNC: 11.6 G/DL (ref 11.5–15.4)
HOLD SPECIMEN: NORMAL
MCH RBC QN AUTO: 31.7 PG (ref 26.8–34.3)
MCHC RBC AUTO-ENTMCNC: 34.5 G/DL (ref 31.4–37.4)
MCV RBC AUTO: 92 FL (ref 82–98)
PLATELET # BLD AUTO: 240 THOUSANDS/UL (ref 149–390)
PMV BLD AUTO: 10.1 FL (ref 8.9–12.7)
RBC # BLD AUTO: 3.66 MILLION/UL (ref 3.81–5.12)
RH BLD: POSITIVE
SPECIMEN EXPIRATION DATE: NORMAL
WBC # BLD AUTO: 10.57 THOUSAND/UL (ref 4.31–10.16)

## 2024-11-02 PROCEDURE — 85027 COMPLETE CBC AUTOMATED: CPT

## 2024-11-02 PROCEDURE — 86901 BLOOD TYPING SEROLOGIC RH(D): CPT

## 2024-11-02 PROCEDURE — 86900 BLOOD TYPING SEROLOGIC ABO: CPT

## 2024-11-02 PROCEDURE — 82948 REAGENT STRIP/BLOOD GLUCOSE: CPT

## 2024-11-02 PROCEDURE — 4A1HXCZ MONITORING OF PRODUCTS OF CONCEPTION, CARDIAC RATE, EXTERNAL APPROACH: ICD-10-PCS | Performed by: OBSTETRICS & GYNECOLOGY

## 2024-11-02 PROCEDURE — 86850 RBC ANTIBODY SCREEN: CPT

## 2024-11-02 PROCEDURE — 86780 TREPONEMA PALLIDUM: CPT

## 2024-11-02 PROCEDURE — 99214 OFFICE O/P EST MOD 30 MIN: CPT

## 2024-11-02 PROCEDURE — NC001 PR NO CHARGE: Performed by: OBSTETRICS & GYNECOLOGY

## 2024-11-02 RX ORDER — LIDOCAINE HYDROCHLORIDE AND EPINEPHRINE 15; 5 MG/ML; UG/ML
INJECTION, SOLUTION EPIDURAL
Status: COMPLETED | OUTPATIENT
Start: 2024-11-02 | End: 2024-11-02

## 2024-11-02 RX ORDER — SODIUM CHLORIDE 9 MG/ML
125 INJECTION, SOLUTION INTRAVENOUS CONTINUOUS
Status: DISCONTINUED | OUTPATIENT
Start: 2024-11-02 | End: 2024-11-03

## 2024-11-02 RX ORDER — SODIUM CHLORIDE, SODIUM LACTATE, POTASSIUM CHLORIDE, CALCIUM CHLORIDE 600; 310; 30; 20 MG/100ML; MG/100ML; MG/100ML; MG/100ML
125 INJECTION, SOLUTION INTRAVENOUS CONTINUOUS
Status: DISCONTINUED | OUTPATIENT
Start: 2024-11-02 | End: 2024-11-02

## 2024-11-02 RX ORDER — ROPIVACAINE HYDROCHLORIDE 2 MG/ML
INJECTION, SOLUTION EPIDURAL; INFILTRATION; PERINEURAL AS NEEDED
Status: DISCONTINUED | OUTPATIENT
Start: 2024-11-02 | End: 2024-11-05 | Stop reason: HOSPADM

## 2024-11-02 RX ORDER — BUPIVACAINE HYDROCHLORIDE 2.5 MG/ML
30 INJECTION, SOLUTION EPIDURAL; INFILTRATION; INTRACAUDAL ONCE AS NEEDED
Status: DISCONTINUED | OUTPATIENT
Start: 2024-11-02 | End: 2024-11-03

## 2024-11-02 RX ORDER — INSULIN GLARGINE 100 [IU]/ML
17 INJECTION, SOLUTION SUBCUTANEOUS
Status: DISCONTINUED | OUTPATIENT
Start: 2024-11-02 | End: 2024-11-03

## 2024-11-02 RX ORDER — OXYTOCIN/RINGER'S LACTATE 30/500 ML
1-30 PLASTIC BAG, INJECTION (ML) INTRAVENOUS
Status: DISCONTINUED | OUTPATIENT
Start: 2024-11-02 | End: 2024-11-03

## 2024-11-02 RX ORDER — ONDANSETRON 2 MG/ML
4 INJECTION INTRAMUSCULAR; INTRAVENOUS EVERY 6 HOURS PRN
Status: DISCONTINUED | OUTPATIENT
Start: 2024-11-02 | End: 2024-11-03

## 2024-11-02 RX ADMIN — PENICILLIN G POTASSIUM 2.5 MILLION UNITS: 20000000 INJECTION, POWDER, FOR SOLUTION INTRAVENOUS at 22:41

## 2024-11-02 RX ADMIN — OLANZAPINE 7.5 MG: 5 TABLET, FILM COATED ORAL at 22:46

## 2024-11-02 RX ADMIN — SODIUM CHLORIDE 125 ML/HR: 0.9 INJECTION, SOLUTION INTRAVENOUS at 16:58

## 2024-11-02 RX ADMIN — FLUOXETINE HYDROCHLORIDE 20 MG: 20 CAPSULE ORAL at 23:51

## 2024-11-02 RX ADMIN — ROPIVACAINE HYDROCHLORIDE: 2 INJECTION, SOLUTION EPIDURAL; INFILTRATION at 23:19

## 2024-11-02 RX ADMIN — LIDOCAINE HYDROCHLORIDE AND EPINEPHRINE 3 ML: 15; 5 INJECTION, SOLUTION EPIDURAL at 22:14

## 2024-11-02 RX ADMIN — OXYTOCIN 2 MILLI-UNITS/MIN: 10 INJECTION INTRAVENOUS at 19:40

## 2024-11-02 RX ADMIN — SODIUM CHLORIDE 525 ML/HR: 0.9 INJECTION, SOLUTION INTRAVENOUS at 22:09

## 2024-11-02 RX ADMIN — PENICILLIN G POTASSIUM 5 MILLION UNITS: 20000000 INJECTION, POWDER, FOR SOLUTION INTRAVENOUS at 16:58

## 2024-11-02 RX ADMIN — LIDOCAINE HYDROCHLORIDE AND EPINEPHRINE 2 ML: 15; 5 INJECTION, SOLUTION EPIDURAL at 22:15

## 2024-11-02 RX ADMIN — ROPIVACAINE HYDROCHLORIDE 5 ML: 2 INJECTION EPIDURAL; INFILTRATION; PERINEURAL at 22:20

## 2024-11-02 NOTE — H&P
"H & P- Obstetrics   Kimberlyn Wills 29 y.o. female MRN: [id number]  Unit/Bed#: LD TRIAGE 4 Encounter: [id number]    Assessment: 29 y.o.  at 36w5d admitted for spontaneous rupture of membranes/labor.  SVE: /-3  FHT: reactive  [place]: ctx q2-4min    Clinical EFW: 71%ile @34w4d  Presentation: cephalic, confirmed by SVE  GBS status: unknown     Plan:   * 36 weeks gestation of pregnancy  Assessment & Plan  Patient presented with spontaneous rupture of membranes and contractions suspicious for labor. SVE 3, [place] with contractions q2-4min, FHR reactive  - admit to labor floor  - IV fluids, clear liquid diet  - PCN for GBS unknown and  status  - labs: CBC, T/S, RPR  - analgesia at maternal request  - management: expectant, anticipate       Insulin controlled gestational diabetes mellitus (GDM) in third trimester  Assessment & Plan  Lab Results   Component Value Date    HGBA1C 5.1 2024     No results for input(s): \"POCGLU\" in the last 72 hours.    Blood Sugar Average: Last 72 hrs:    - A2GDM fingerstick protocol  - Lantus 17U qhs (half of prelabor dose)  - 2hr GTT 6wks postpartum    Tobacco use  Assessment & Plan  - nicotine patch PRN    Bipolar 2 disorder, major depressive episode (HCC)  Assessment & Plan  - continue home Prozac, Zyprexa      Discussed case and plan w/ Dr. Samson    Chief Concern: leaking fluid    HPI: Kimberlyn Wills is a 29 y.o.  with an WARREN of 2024, by Last Menstrual Period at 36w5d who is being admitted for spontaneous rupture of membranes. History is significant for asthma, bipolar 2 disorder, tobacco use. Pregnancy is complicated by A2GDM, polyhydramnios, chorioamniotic separation. Her prior pregnancies were complicated by placental abruption, postpartum depression. She reports mild intermittent contractions, has large amounts of fluid leaking since 1130 this AM, and reports no VB. She states she has felt good FM.    Patient Active Problem List   Diagnosis    " ASCUS of cervix with negative high risk HPV    Bipolar 2 disorder, major depressive episode (HCC)    Dislocation of right patella    Generalized anxiety disorder    History of drug use    Incomplete RBBB    Osteochondral defect of femoral condyle    Seasonal allergies    Tobacco use    Medical marijuana use    History of IUGR (intrauterine growth retardation) and stillbirth, currently pregnant, third trimester    Insulin controlled gestational diabetes mellitus (GDM) in third trimester    36 weeks gestation of pregnancy    Obesity complicating pregnancy in second trimester    BMI 35.0-35.9,adult    Uterine contractions    Polyhydramnios in third trimester    Prenatal care in third trimester    Abdominal pain during pregnancy in third trimester    Chorioamniotic separation, antepartum     OB Hx:  OB History    Para Term  AB Living   2 1 1     1   SAB IAB Ectopic Multiple Live Births           1      # Outcome Date GA Lbr Felipe/2nd Weight Sex Type Anes PTL Lv   2 Current            1 Term 18 38w4d / 00:09 2356 g (5 lb 3.1 oz) F Vag-Spont EPI, Local  KIT      Birth Comments: Thickened NT, marginal PCI and fetal IUGR with normal Dopplers found during pregnancy      Complications: Abruptio Placenta     Past Medical Hx:  Past Medical History:   Diagnosis Date    Asthma     Bipolar 2 disorder (HCC)     Gestational diabetes     History of postpartum depression     Psychiatric disorder     depression and anxiety     Past Surgical hx:  Past Surgical History:   Procedure Laterality Date    DENTAL IMPLANT      ORTHOPEDIC SURGERY      right knee    WISDOM TOOTH EXTRACTION       Social History     Socioeconomic History    Marital status: Single     Spouse name: Not on file    Number of children: Not on file    Years of education: Not on file    Highest education level: Not on file   Occupational History    Not on file   Tobacco Use    Smoking status: Every Day     Types: Cigarettes    Smokeless  tobacco: Not on file    Tobacco comments:     Currently 2 cigarettes per day     Weaning- working on stopping completely and start nicotine patch   Vaping Use    Vaping status: Former    Substances: Nicotine, Flavoring   Substance and Sexual Activity    Alcohol use: Not Currently     Comment: pregnant    Drug use: Yes     Types: Marijuana     Comment: has medical marijuana use. has been using sporadically in pregnancy. will review with MFM per patient    Sexual activity: Yes     Partners: Male     Comment: pregnant   Other Topics Concern    Not on file   Social History Narrative    Not on file     Social Determinants of Health     Financial Resource Strain: Not on file   Food Insecurity: No Food Insecurity (2024)    Nursing - Inadequate Food Risk Classification     Worried About Running Out of Food in the Last Year: Never true     Ran Out of Food in the Last Year: Never true     Ran Out of Food in the Last Year: Not on file   Transportation Needs: No Transportation Needs (2024)    PRAPARE - Transportation     Lack of Transportation (Medical): No     Lack of Transportation (Non-Medical): No   Physical Activity: Not on file   Stress: Not on file   Social Connections: Not on file   Intimate Partner Violence: Not on file   Housing Stability: Low Risk  (2024)    Housing Stability Vital Sign     Unable to Pay for Housing in the Last Year: No     Number of Times Moved in the Last Year: 0     Homeless in the Last Year: No     No Known Allergies      Medications Prior to Admission:     FLUoxetine (PROzac) 20 mg capsule    Lantus SoloStar 100 units/mL SOPN    OLANZapine (ZyPREXA) 5 mg tablet    Prenatal Multivit-Min-Fe-FA (PRE- PO)    Blood Glucose Monitoring Suppl (OneTouch Verio Flex System) w/Device KIT    Insulin Pen Needle 31G X 5 MM MISC    nicotine (NICODERM CQ) 7 mg/24hr TD 24 hr patch    nicotine polacrilex (COMMIT) 2 MG lozenge    OneTouch Delica Lancets 33G MISC    OneTouch Verio test  strip    Objective:  Temp:  [98.4 °F (36.9 °C)] 98.4 °F (36.9 °C)  HR:  [96] 96  BP: (134)/(78) 134/78  Resp:  [18] 18  SpO2:  [96 %] 96 %  Body mass index is 38.11 kg/m².     Physical Exam:  Physical Exam  Constitutional:       General: She is not in acute distress.     Appearance: She is well-developed.   HENT:      Head: Normocephalic and atraumatic.   Cardiovascular:      Rate and Rhythm: Normal rate.   Pulmonary:      Effort: Pulmonary effort is normal. No respiratory distress.   Skin:     Findings: No rash (on exposed skin).   Neurological:      Mental Status: She is alert and oriented to person, place, and time.   Psychiatric:         Mood and Affect: Affect normal.         Speech: Speech normal.         Behavior: Behavior normal.        FHT:  Reactive    TOCO:   Ctx q2-4min    Lab Results   Component Value Date    WBC 8.85 09/06/2024    HGB 12.1 09/06/2024    HCT 35.7 09/06/2024     09/06/2024     Lab Results   Component Value Date    K 4.0 09/06/2024     09/06/2024    CO2 26 09/06/2024    BUN 8 09/06/2024    CREATININE 0.48 (L) 09/06/2024    GLUCOSE 124 09/18/2016    AST 11 (L) 09/06/2024    ALT 9 09/06/2024     Prenatal Labs:   Blood type: O pos  Antibody: neg  GBS: pending  HIV: nr  Rubella: immune  Syphilis IgM/IgG: nr  HBsAg: nr  HCAb: nr  Chlamydia: neg  Gonorrhea: neg  Diabetes 1 hour screen: 187  3 hour glucose: n/a  Platelets: pending  Hgb: pending  Expected length of stay: >2 Midnights  Admission status: INPATIENT     Crys Goldberg MD  OBGYN PGY-1  11/02/24  2:36 PM

## 2024-11-02 NOTE — TELEPHONE ENCOUNTER
"Reason for Disposition   Leakage of fluid from vagina  (Exception: Patient is uncertain, but thinks it might be urine incontinence.)    Answer Assessment - Initial Assessment Questions  1. ONSET: \"When did you notice the fluid coming out of your vagina?\"         This was around 1130am  2. CONTRACTIONS: \"Are you having any contractions?\" If Yes, ask: \"Describe the contractions that you are having.\" (e.g., duration, frequency, regularity, severity)      No  3. WARREN: \"What date are you expecting to deliver?\"      Working WARREN: 11/25/2024  4. PARITY: \"Have you had a baby before?\" If Yes, ask: \"How long did the labor last?\"      Yes, labor was quick  5. FETAL MOVEMENT: \"Has the baby's movement decreased or changed significantly from normal?\"      Mom hasn't noticed  6. OTHER SYMPTOMS: \"Do you have any other symptoms?\" (e.g., abdomen pain, fever, hand or face swelling, vaginal bleeding)    Swelling in ankles but that has been normal. Body shakes last night after having contractions    Protocols used: Pregnancy - Rupture of Membranes Suspected-Adult-    On call Provider paged and recommended Labor and Delivery Triage. Patient informed. Charge RN notified.   "

## 2024-11-02 NOTE — OB LABOR/OXYTOCIN SAFETY PROGRESS
Labor progress note  Kimberlyn Wills 29 y.o. female MRN: [id number]    Unit/Bed#: -01 Encounter: [id number]       Contraction Frequency (minutes): 2-6  Contraction Intensity: Mild/Moderate     Cervical Dilation: 3        Cervical Effacement: 80  Fetal Station: -2  Baseline Rate (FHR): 120 bpm  Fetal Heart Rate (FHT): 138 BPM  FHR Category: Category I      Vital Signs:   Vitals:    11/02/24 1641   BP: 119/56   Pulse: 96   Resp: 18   Temp: 99 °F (37.2 °C)   SpO2: 96%       Notes/comments:   Reviewed possible need for oxytocin, will re evaluate at next exam, continue to monitor and manage.    Reshma Samson MD 11/2/2024 5:42 PM

## 2024-11-02 NOTE — ASSESSMENT & PLAN NOTE
Lab Results   Component Value Date    HGBA1C 5.1 09/06/2024     Recent Labs     11/02/24  2357 11/03/24  0105 11/03/24  0259 11/03/24  0515   POCGLU 100 105 88 103       Blood Sugar Average: Last 72 hrs:  (P) 101.5  - 2hr GTT 6wks postpartum

## 2024-11-02 NOTE — ASSESSMENT & PLAN NOTE
Patient progressing toward postpartum milestones.  - Continue routine postpartum care  - Pain management with oral analgesics  - Encourage breastfeeding, familial bonding

## 2024-11-02 NOTE — OB LABOR/OXYTOCIN SAFETY PROGRESS
Oxytocin Safety Progress Check Note - Kimberlyn Wills 29 y.o. female MRN: [id number]    Unit/Bed#: -01 Encounter: [id number]       Contraction Frequency (minutes): 5-8  Contraction Intensity: Mild       Baseline Rate (FHR): 120 bpm  Fetal Heart Rate (FHT): 150 BPM  FHR Category: Category 1      Vital Signs:   Vitals:    11/02/24 1641   BP: 119/56   Pulse: 96   Resp: 18   Temp: 99 °F (37.2 °C)   SpO2: 96%       Notes/comments:   Reviewed with patient contractions are not increasing and in fact have spaced out, reviewed this is common with PROM.  Counseled on oxytocin patient and family amenable with no further questions    Reshma Samson MD 11/2/2024 6:59 PM

## 2024-11-03 LAB
BASE EXCESS BLDCOA CALC-SCNC: -6.7 MMOL/L (ref 3–11)
BASE EXCESS BLDCOV CALC-SCNC: -5.4 MMOL/L (ref 1–9)
GLUCOSE SERPL-MCNC: 103 MG/DL (ref 65–140)
GLUCOSE SERPL-MCNC: 105 MG/DL (ref 65–140)
GLUCOSE SERPL-MCNC: 88 MG/DL (ref 65–140)
HCO3 BLDCOA-SCNC: 20.4 MMOL/L (ref 17.3–27.3)
HCO3 BLDCOV-SCNC: 19.5 MMOL/L (ref 12.2–28.6)
O2 CT VFR BLDCOA CALC: 4.8 ML/DL
OXYHGB MFR BLDCOA: 22.1 %
OXYHGB MFR BLDCOV: 69 %
PCO2 BLDCOA: 46.2 MM[HG] (ref 30–60)
PCO2 BLDCOV: 36.6 MM HG (ref 27–43)
PH BLDCOA: 7.26 [PH] (ref 7.23–7.43)
PH BLDCOV: 7.34 [PH] (ref 7.19–7.49)
PO2 BLDCOA: 13.7 MM HG (ref 5–25)
PO2 BLDCOV: 28.6 MM HG (ref 15–45)
SAO2 % BLDCOV: 16.1 ML/DL
TREPONEMA PALLIDUM IGG+IGM AB [PRESENCE] IN SERUM OR PLASMA BY IMMUNOASSAY: NORMAL

## 2024-11-03 PROCEDURE — 10H07YZ INSERTION OF OTHER DEVICE INTO PRODUCTS OF CONCEPTION, VIA NATURAL OR ARTIFICIAL OPENING: ICD-10-PCS | Performed by: OBSTETRICS & GYNECOLOGY

## 2024-11-03 PROCEDURE — 88307 TISSUE EXAM BY PATHOLOGIST: CPT | Performed by: PATHOLOGY

## 2024-11-03 PROCEDURE — 82948 REAGENT STRIP/BLOOD GLUCOSE: CPT

## 2024-11-03 PROCEDURE — 82805 BLOOD GASES W/O2 SATURATION: CPT | Performed by: OBSTETRICS & GYNECOLOGY

## 2024-11-03 PROCEDURE — 59400 OBSTETRICAL CARE: CPT | Performed by: OBSTETRICS & GYNECOLOGY

## 2024-11-03 RX ORDER — SIMETHICONE 80 MG
80 TABLET,CHEWABLE ORAL 4 TIMES DAILY PRN
Status: DISCONTINUED | OUTPATIENT
Start: 2024-11-03 | End: 2024-11-05 | Stop reason: HOSPADM

## 2024-11-03 RX ORDER — ONDANSETRON 2 MG/ML
4 INJECTION INTRAMUSCULAR; INTRAVENOUS EVERY 8 HOURS PRN
Status: DISCONTINUED | OUTPATIENT
Start: 2024-11-03 | End: 2024-11-05 | Stop reason: HOSPADM

## 2024-11-03 RX ORDER — OXYTOCIN/RINGER'S LACTATE 30/500 ML
250 PLASTIC BAG, INJECTION (ML) INTRAVENOUS ONCE
Status: DISCONTINUED | OUTPATIENT
Start: 2024-11-03 | End: 2024-11-05 | Stop reason: HOSPADM

## 2024-11-03 RX ORDER — CALCIUM CARBONATE 500 MG/1
1000 TABLET, CHEWABLE ORAL 3 TIMES DAILY PRN
Status: DISCONTINUED | OUTPATIENT
Start: 2024-11-03 | End: 2024-11-05 | Stop reason: HOSPADM

## 2024-11-03 RX ORDER — IBUPROFEN 600 MG/1
600 TABLET, FILM COATED ORAL EVERY 6 HOURS PRN
Status: DISCONTINUED | OUTPATIENT
Start: 2024-11-03 | End: 2024-11-05 | Stop reason: HOSPADM

## 2024-11-03 RX ORDER — ACETAMINOPHEN 325 MG/1
650 TABLET ORAL EVERY 6 HOURS PRN
Status: DISCONTINUED | OUTPATIENT
Start: 2024-11-03 | End: 2024-11-05 | Stop reason: HOSPADM

## 2024-11-03 RX ORDER — DIPHENHYDRAMINE HYDROCHLORIDE 50 MG/ML
25 INJECTION INTRAMUSCULAR; INTRAVENOUS EVERY 6 HOURS PRN
Status: DISCONTINUED | OUTPATIENT
Start: 2024-11-03 | End: 2024-11-05 | Stop reason: HOSPADM

## 2024-11-03 RX ORDER — BENZOCAINE/MENTHOL 6 MG-10 MG
1 LOZENGE MUCOUS MEMBRANE DAILY PRN
Status: DISCONTINUED | OUTPATIENT
Start: 2024-11-03 | End: 2024-11-05 | Stop reason: HOSPADM

## 2024-11-03 RX ADMIN — SODIUM CHLORIDE 125 ML/HR: 0.9 INJECTION, SOLUTION INTRAVENOUS at 04:00

## 2024-11-03 RX ADMIN — IBUPROFEN 600 MG: 600 TABLET, FILM COATED ORAL at 22:07

## 2024-11-03 RX ADMIN — ROPIVACAINE HYDROCHLORIDE: 2 INJECTION, SOLUTION EPIDURAL; INFILTRATION at 04:29

## 2024-11-03 RX ADMIN — IBUPROFEN 600 MG: 600 TABLET, FILM COATED ORAL at 16:01

## 2024-11-03 RX ADMIN — PENICILLIN G POTASSIUM 2.5 MILLION UNITS: 20000000 INJECTION, POWDER, FOR SOLUTION INTRAVENOUS at 01:39

## 2024-11-03 RX ADMIN — PENICILLIN G POTASSIUM 2.5 MILLION UNITS: 20000000 INJECTION, POWDER, FOR SOLUTION INTRAVENOUS at 05:27

## 2024-11-03 RX ADMIN — ONDANSETRON 4 MG: 2 INJECTION INTRAMUSCULAR; INTRAVENOUS at 04:52

## 2024-11-03 RX ADMIN — OLANZAPINE 7.5 MG: 5 TABLET, FILM COATED ORAL at 22:07

## 2024-11-03 RX ADMIN — BENZOCAINE AND LEVOMENTHOL 1 APPLICATION: 200; 5 SPRAY TOPICAL at 14:42

## 2024-11-03 RX ADMIN — FLUOXETINE HYDROCHLORIDE 20 MG: 20 CAPSULE ORAL at 22:07

## 2024-11-03 NOTE — OB LABOR/OXYTOCIN SAFETY PROGRESS
Oxytocin Safety Progress Check Note - Kimberlyn Wills 29 y.o. female MRN: [id number]    Unit/Bed#: -01 Encounter: [id number]    Dose (zach-units/min) Oxytocin: 20 zach-units/min  Contraction Frequency (minutes): 2-3  Contraction Intensity: Moderate/Strong  Uterine Activity Characteristics: Regular  Cervical Dilation: 9        Cervical Effacement: 90  Fetal Station: -1  Baseline Rate (FHR): 135 bpm  Fetal Heart Rate (FHT): 162 BPM  FHR Category: II             Vital Signs:   Vitals:    24 0448   BP: 133/89   Pulse: (!) 107   Resp:    Temp:    SpO2:      Notes/comments:   Pt reporting increased pressure. FHR Category II, intermittent variable decels. [place] with contractions q2min. SVE as above. Pit running at 20, continue to titrate to contractions. Will recheck as clinically indicated. Anticipate . Attending physician Dr. Chapin Goldberg MD 11/3/2024 5:26 AM

## 2024-11-03 NOTE — OB LABOR/OXYTOCIN SAFETY PROGRESS
Oxytocin Safety Progress Check Note - Kimberlyn Wills 29 y.o. female MRN: [id number]    Unit/Bed#: -01 Encounter: [id number]    Dose (zach-units/min) Oxytocin: 20 zach-units/min  Contraction Frequency (minutes): 2-3  Contraction Intensity: Moderate/Strong  Uterine Activity Characteristics: Regular  Cervical Dilation: 5        Cervical Effacement: 80  Fetal Station: -1  Baseline Rate (FHR): 140 bpm  Fetal Heart Rate (FHT): 162 BPM  FHR Category: II             Vital Signs:   Vitals:    24 0422   BP:    Pulse:    Resp: 18   Temp: 97.6 °F (36.4 °C)   SpO2:      Notes/comments:   Pt resting comfortably. FHR Category II, recurrent variable decels. Nursing performing maternal repositioning and will administer fluids. [place] with contractions q2min. SVE as above. Pit running at 22, will decrease to 20. Will recheck in 2 hours or sooner if clinically indicated. Anticipate . Attending physician Dr. Chapin Goldberg MD 11/3/2024 4:26 AM

## 2024-11-03 NOTE — LACTATION NOTE
This note was copied from a baby's chart.  CONSULT - LACTATION  Baby Boy (Adilson Wills 0 days male MRN: [id number]    [facility] AN NURSERY Room / Bed: (N)/(N) Encounter: [id number]    Maternal Information     MOTHER:  Kimberlyn Wills  Maternal Age: 29 y.o.  OB History: # 1 - Date: 18, Sex: Female, Weight: 2356 g (5 lb 3.1 oz), GA: 38w4d, Type: Vaginal, Spontaneous, Apgar1: 9, Apgar5: 9, Living: Living, Birth Comments: Thickened NT, marginal PCI and fetal IUGR with normal Dopplers found during pregnancy    # 2 - Date: 24, Sex: Male, Weight: 2710 g (5 lb 15.6 oz), GA: 36w6d, Type: Vaginal, Spontaneous, Apgar1: 2, Apgar5: 2, Living: Living, Birth Comments: None   Previouse breast reduction surgery? No    Lactation history:   Has patient previously breast fed: Yes   How long had patient previously breast fed: over 1 year with 6 yr old   Previous breast feeding complications: None     Past Surgical History:   Procedure Laterality Date    DENTAL IMPLANT      ORTHOPEDIC SURGERY      right knee    WISDOM TOOTH EXTRACTION         Birth information:  YOB: 2024   Time of birth: 5:50 AM   Sex: male   Delivery type: Vaginal, Spontaneous   Birth Weight: 2710 g (5 lb 15.6 oz)   Percent of Weight Change: 0%     Gestational Age: 36w6d   [unfilled]    Assessment     Breast and nipple assessment: normal assessment    [place] Assessment:  sleepy; bruised face from delivery    Feeding assessment:  strong first latch; sleepy in first 24 and LPI - no latch since  LATCH:  Latch: Repeated attempts, hold nipple in mouth, stimulate to suck   Audible Swallowing: Spontaneous and intermittent (24 hours old)   Type of Nipple: Everted (After stimulation)   Comfort (Breast/Nipple): Soft/non-tender   Hold (Positioning): No assist from staff, mother able to position/hold infant   LATCH Score: 9          Feeding recommendations:   breastfeed on semi demand schedule  waking baby every 2-3 hours; pump and provide expressed milk if no latch or short feed     Met with Kimberlyn who is breastfeeding her baby boy. Baby boy is a late  infant and on 24 hour glucose protocol. Kimberlyn reports that baby latched well at first but is now sleepy and will not rouse or latch. Kimberlyn was hand pumping upon entering the room.     Resized for a larger flange 32.5 mm. Discussed optimal flange fitting. Discussed hand expression, hand pumping, and double electric pumping. Discussed expressing with whatever means is comfortable and effective for her. Kimberlyn has manually pumped a large quantity of visibile colostrum.    Discussed cup, spoon, syringe, and paced bottle feeding options to feed baby. Discussed offering the breast and attempting latching for 5 minutes. If baby does not latch, offer expressed milk. Encouraged to watch for active feeding if baby latches and then express milk and provide to maintain blood sugar levels.    Provided with and reviewed the Ready Set Baby and the Discharge Lactation Booklets. Encouraged to call for lactation support as needed. Kimberlyn has no questions at this time.    Cirilo Gonzalez MA 11/3/2024 6:52 PM

## 2024-11-03 NOTE — OB LABOR/OXYTOCIN SAFETY PROGRESS
Oxytocin Safety Progress Check Note - Kimberlyn Wills 29 y.o. female MRN: [id number]    Unit/Bed#: -01 Encounter: [id number]    Dose (zach-units/min) Oxytocin: 10 zach-units/min  Contraction Frequency (minutes): 2-6.5  Contraction Intensity: Moderate  Uterine Activity Characteristics: Irregular  Cervical Dilation: 4        Cervical Effacement: 80  Fetal Station: -1  Baseline Rate (FHR): 135 bpm  Fetal Heart Rate (FHT): 162 BPM  FHR Category: I             Vital Signs:   Vitals:    24 2319   BP: 121/61   Pulse: 84   Resp:    Temp:    SpO2:      Notes/comments:   Pt resting comfortably. FHR Category I. Organ with contractions q2-5min. SVE as above. [place] running at 10, continue to titrate to contractions. Will recheck in 2 hours or sooner if clinically indicated. Anticipate . Attending physician Dr. Chapin Goldberg MD 11/3/2024 12:19 AM

## 2024-11-03 NOTE — ANESTHESIA PROCEDURE NOTES
Epidural Block    Patient location during procedure: OB/L&D  Start time: 11/2/2024 10:13 PM  Reason for block: procedure for pain  Staffing  Performed by: Paulette Cao CRNA  Authorized by: Maykel Branham MD    Preanesthetic Checklist  Completed: patient identified, IV checked, site marked, risks and benefits discussed, surgical consent, monitors and equipment checked, pre-op evaluation and timeout performed  Epidural  Patient position: sitting  Prep: ChloraPrep  Sedation Level: no sedation  Patient monitoring: frequent blood pressure checks, continuous pulse oximetry and heart rate  Approach: midline  Location: lumbar, L3-4  Injection technique: SALEEM saline  Needle  Needle type: Tuohy   Needle gauge: 17 G  Needle insertion depth: 6 cm  Catheter type: multi-orifice  Catheter size: 19 G  Catheter at skin depth: 12 cm  Catheter securement method: stabilization device and clear occlusive dressing  Test dose: negativelidocaine-epinephrine (XYLOCAINE-MPF/EPINEPHRINE) 1.5 %-1:200,000 injection 3 mL - Epidural   3 mL - 11/2/2024 10:14:00 PM   2 mL - 11/2/2024 10:15:00 PM  Assessment  Sensory level: T10  Number of attempts: 2negative aspiration for CSF, negative aspiration for heme and no paresthesia on injection  patient tolerated the procedure well with no immediate complications  Additional Notes  Skin pass x2 at L3-4. Off midline, redirect. SALEEM 6cm. Catheter passed without difficulty.

## 2024-11-03 NOTE — OB LABOR/OXYTOCIN SAFETY PROGRESS
Oxytocin Safety Progress Check Note - Kimberlyn Wills 29 y.o. female MRN: [id number]    Unit/Bed#: -01 Encounter: [id number]    Dose (zach-units/min) Oxytocin: 16 zach-units/min  Contraction Frequency (minutes): 2-3  Contraction Intensity: Moderate  Uterine Activity Characteristics: Irregular  Cervical Dilation: 4        Cervical Effacement: 80  Fetal Station: -1  Baseline Rate (FHR): 135 bpm  Fetal Heart Rate (FHT): 162 BPM  FHR Category: I             Vital Signs:   Vitals:    24 0148   BP: 103/57   Pulse: 85   Resp:    Temp:    SpO2:      Notes/comments:   Pt resting comfortably. FHR Category I. [facility] with irregular contractions. IUPC placed for more sensitive contraction monitoring. SVE as above. Pit running at 18, continue to titrate to contractions. Will recheck in 2 hours or sooner if clinically indicated. Anticipate . Attending physician Dr. Chapin Goldberg MD 11/3/2024 1:53 AM

## 2024-11-03 NOTE — L&D DELIVERY NOTE
Vaginal Delivery Summary - OB/GYN   Kimberlyn Wills 29 y.o. female MRN: [id number]  Unit/Bed#: -01 Encounter: [id number]      Pre-delivery Diagnosis:   Pregnancy at 36w5d  Spontaneous rupture of membranes  A2GDM  Tobacco use  Bipolar 2 disorder    Post-delivery Diagnosis:   Same, delivered    Procedure: Spontaneous Vaginal Delivery    Attending: Dr. Samson    Assistant(s): Dr. Goldberg    Anesthesia: Epidural    QBL: 184 mL    Complications: none apparent    Specimens:   1. Arterial and venous cord gases  2. Cord blood  3. Segment of umbilical cord  4. Placenta to pathology    Findings:  1. Viable male on 11/3/2024 at 0550, with APGARS of 2 and 2 at 1 and 5 minutes respectively  2. Spontaneous delivery of intact placenta at 0554  3. Blood gases:  Recent Results (from the past 1 hour(s))   CORD, Blood gas, arterial    Collection Time: 24  5:55 AM   Result Value Ref Range    pH, Cord Art 7.262 7.230 - 7.430    pCO2, Cord Art 46.2 30.0 - 60.0    pO2, Cord Art 13.7 5.0 - 25.0 mm HG    HCO3, Cord Art 20.4 17.3 - 27.3 mmol/L    Base Exc, Cord Art -6.7 (L) 3.0 - 11.0 mmol/L    O2 Content, Cord Art 4.8 ml/dl    O2 Hgb, Arterial Cord 22.1 %     Disposition:  Patient tolerated the delivery well and was recovering in labor and delivery room     Brief history and labor course:  Ms. Kimberlyn Wills presented as a 29 y.o.  at 36w5d with history significant for A2GDM, tobacco use, bipolar 2 disorder. She presented to labor and delivery for spontaneous rupture of membranes. On initial exam she was noted to be 2/50/-3. She was augmented with pitocin. She progressed to complete and began to push.    Description of delivery:  After pushing for 15 minutes, at 0550 patient delivered a viable male , wt pending, apgars of 2 (1 min) and 2 (5 min). The fetal vertex delivered spontaneously in the occiput posterior position. There was a double nuchal cord, which was reduced. The left shoulder delivered atraumatically  with maternal expulsive forces and the assistance of gentle downward traction. The right shoulder delivered with maternal expulsive forces and the assistance of gentle upward traction. The remainder of the fetus delivered spontaneously.     Upon delivery, the infant was placed on the mother's abdomen and the cord was clamped and cut immediately. The infant was taken to the warmer to be evaluated by NICU staff. Arterial and venous cord blood gases and cord blood was collected for analysis. These were promptly sent to the lab. In the immediate post-partum, 30 units of IV pitocin was administered, and the uterus was noted to contract down well with massage and pitocin. The placenta delivered spontaneously at 0554 and was noted to have a centrally inserted 3 vessel cord.     The vagina, cervix, perineum, and rectum were inspected and there was noted to be no lacerations.    Bimanual exam revealed minimal clots and good uterine tone. The fundus was firm and at the level of the umbilicus. All needle, sponge, and instrument counts were noted to be correct. Patient tolerated the delivery well and was allowed to recover in the labor and delivery room with family and  before being transferred to the post-partum floor. Dr. Samson was present and participated in all key portions of the case.    Crys Goldberg MD  OBGYN PGY-1  24  6:33 AM

## 2024-11-03 NOTE — DISCHARGE SUMMARY
Discharge Summary - OB/GYN  Kimberlyn Wills 29 y.o. female MRN: [id number]  Unit/Bed#: -01 Encounter: [id number]    Admission Date: 2024     Discharge Date: 2024    Admitting Attending: Reshma Samson MD    Delivering Attending: Dr. Samson    Discharging Attending: Dr. Tirado    Principal Diagnosis: Pregnancy at 36w6d    Secondary Diagnosis, Admission:  Spontaneous rupture of membranes  A2GDM  Tobacco use  Bipolar 2 disorder    Discharge Diagnosis:  Same, delivered  2. gHTN    Procedures: spontaneous vaginal delivery    Anesthesia: epidural    Hospital course:  Ms. Kimberlyn Wills presented as a 29 y.o.  at 36w5d with history significant for A2GDM, tobacco use, bipolar 2 disorder. She presented to labor and delivery for spontaneous rupture of membranes. On initial exam she was noted to be 2/50/-3. She was augmented with pitocin. She progressed to complete and began to push.    After pushing for 15 minutes, at 0550 patient delivered a viable male , wt pending, apgars of 2 (1 min) and 2 (5 min). The fetal vertex delivered spontaneously in the occiput posterior position. There was a double nuchal cord, which was reduced. The left shoulder delivered atraumatically with maternal expulsive forces and the assistance of gentle downward traction. The right shoulder delivered with maternal expulsive forces and the assistance of gentle upward traction. The remainder of the fetus delivered spontaneously.   The patient had no perineal laceration during delivery.  was transferred to  nursery.     Her post-delivery course was complicated gHTN CBC, CMP wnl, blood pressure within normal range. Mom's blood type is O positive, so RhoGAM was not indicated.    Her postpartum pain was well controlled with oral analgesics. On day of discharge, she was ambulating and able to reasonably perform all ADLs. She was voiding and had appropriate bowel function. She was discharged home on postpartum day  #2 without complications. She was instructed to follow up with her OB as an outpatient and was given appropriate warnings to call provider if she develops signs of infection or uncontrolled pain.    Complications: none apparent    Condition at discharge: good     Discharge medications and instructions:   Provisions for Follow-Up Care:  See after visit summary    Disposition: Home    Planned Readmission: No    Guilelrmo Mondragon MD  OB GYN PGY1  11/05/24  6:33 AM     Case reviewed and agree with discharge summary as documented.    Danielle Tirado MD  11/05/24

## 2024-11-03 NOTE — ANESTHESIA POSTPROCEDURE EVALUATION
Post-Op Assessment Note    CV Status:  Stable    Pain management: adequate      Post-op block assessment: no complications, site cleaned and catheter intact   Mental Status:  Alert and awake   Hydration Status:  Euvolemic   PONV Controlled:  Controlled   Airway Patency:  Patent     Post Op Vitals Reviewed: Yes    No anethesia notable event occurred.    Staff: CRNA           Last Filed PACU Vitals:  Vitals Value Taken Time   Temp     Pulse 90 11/03/24 0648   /71 11/03/24 0648   Resp     SpO2     Vitals shown include unfiled device data.    Modified Selena:  No data recorded

## 2024-11-03 NOTE — OB LABOR/OXYTOCIN SAFETY PROGRESS
Oxytocin Safety Progress Check Note - Kimberlyn Wills 29 y.o. female MRN: [id number]    Unit/Bed#: -01 Encounter: [id number]    Dose (zach-units/min) Oxytocin: 6 zach-units/min  Contraction Frequency (minutes): 1.5-4  Contraction Intensity: Mild/Moderate  Uterine Activity Characteristics: Irregular  Cervical Dilation: 4        Cervical Effacement: 80  Fetal Station: -1  Baseline Rate (FHR): 125 bpm  Fetal Heart Rate (FHT): 136 BPM  FHR Category: Category I      Vital Signs:   Vitals:    11/02/24 2100   BP: 128/67   Pulse: 80   Resp:    Temp:    SpO2:        Notes/comments:   Patient feeling increased contractions, planning epidural, continue to monitor and manage.    Reshma Samson MD 11/2/2024 9:45 PM

## 2024-11-03 NOTE — ANESTHESIA PREPROCEDURE EVALUATION
Procedure:  LABOR ANALGESIA    Relevant Problems   CARDIO   (+) Incomplete RBBB      GYN   (+) 36 weeks gestation of pregnancy      NEURO/PSYCH   (+) Generalized anxiety disorder        Physical Exam    Airway    Mallampati score: III         Dental       Cardiovascular      Pulmonary      Other Findings  post-pubertal.      Anesthesia Plan  ASA Score- 2     Anesthesia Type- epidural with ASA Monitors.         Additional Monitors:     Airway Plan:            Plan Factors-    Chart reviewed.   Existing labs reviewed. Patient summary reviewed.    Patient is a current smoker.              Induction-     Postoperative Plan-     Perioperative Resuscitation Plan - Level 1 - Full Code.       Informed Consent- Anesthetic plan and risks discussed with patient.  I personally reviewed this patient with the CRNA. Discussed and agreed on the Anesthesia Plan with the CRNA..

## 2024-11-04 DIAGNOSIS — Z13.1 DIABETES MELLITUS SCREENING: Primary | ICD-10-CM

## 2024-11-04 DIAGNOSIS — Z86.32 HISTORY OF GESTATIONAL DIABETES: ICD-10-CM

## 2024-11-04 PROBLEM — O13.9 GESTATIONAL HYPERTENSION: Status: ACTIVE | Noted: 2024-11-04

## 2024-11-04 LAB
ALBUMIN SERPL BCG-MCNC: 3.3 G/DL (ref 3.5–5)
ALP SERPL-CCNC: 83 U/L (ref 34–104)
ALT SERPL W P-5'-P-CCNC: 12 U/L (ref 7–52)
ANION GAP SERPL CALCULATED.3IONS-SCNC: 5 MMOL/L (ref 4–13)
AST SERPL W P-5'-P-CCNC: 18 U/L (ref 13–39)
BILIRUB SERPL-MCNC: 0.29 MG/DL (ref 0.2–1)
BUN SERPL-MCNC: 7 MG/DL (ref 5–25)
CALCIUM ALBUM COR SERPL-MCNC: 9 MG/DL (ref 8.3–10.1)
CALCIUM SERPL-MCNC: 8.4 MG/DL (ref 8.4–10.2)
CHLORIDE SERPL-SCNC: 107 MMOL/L (ref 96–108)
CO2 SERPL-SCNC: 26 MMOL/L (ref 21–32)
CREAT SERPL-MCNC: 0.56 MG/DL (ref 0.6–1.3)
GFR SERPL CREATININE-BSD FRML MDRD: 126 ML/MIN/1.73SQ M
GLUCOSE SERPL-MCNC: 124 MG/DL (ref 65–140)
GP B STREP DNA SPEC QL NAA+PROBE: NEGATIVE
POTASSIUM SERPL-SCNC: 3.6 MMOL/L (ref 3.5–5.3)
PROT SERPL-MCNC: 5.8 G/DL (ref 6.4–8.4)
SODIUM SERPL-SCNC: 138 MMOL/L (ref 135–147)

## 2024-11-04 PROCEDURE — 80053 COMPREHEN METABOLIC PANEL: CPT

## 2024-11-04 PROCEDURE — 99024 POSTOP FOLLOW-UP VISIT: CPT | Performed by: OBSTETRICS & GYNECOLOGY

## 2024-11-04 RX ADMIN — OLANZAPINE 7.5 MG: 5 TABLET, FILM COATED ORAL at 22:45

## 2024-11-04 RX ADMIN — FLUOXETINE HYDROCHLORIDE 20 MG: 20 CAPSULE ORAL at 22:45

## 2024-11-04 RX ADMIN — IBUPROFEN 600 MG: 600 TABLET, FILM COATED ORAL at 04:13

## 2024-11-04 RX ADMIN — IBUPROFEN 600 MG: 600 TABLET, FILM COATED ORAL at 22:45

## 2024-11-04 RX ADMIN — IBUPROFEN 600 MG: 600 TABLET, FILM COATED ORAL at 15:03

## 2024-11-04 NOTE — ASSESSMENT & PLAN NOTE
Blood pressure monitoring    Systolic (12hrs), Av , Min:109 , Max:117   Diastolic (12hrs), Av, Min:69, Max:83    CBC wnl; CMP ordered 2024

## 2024-11-04 NOTE — PROGRESS NOTES
"Progress Note - OB/GYN   Name: Kimberlyn Wills 29 y.o. female I MRN: [id number]  Unit/Bed#: -01 I Date of Admission: 2024   Date of Service: 2024 I Hospital Day: 2     Assessment & Plan   (spontaneous vaginal delivery)  Patient progressing toward postpartum milestones.  - Continue routine postpartum care  - Pain management with oral analgesics  - Encourage breastfeeding, familial bonding    Bipolar 2 disorder, major depressive episode (HCC)  - continue home Prozac, Zyprexa  Tobacco use  - nicotine patch PRN  Insulin controlled gestational diabetes mellitus (GDM) in third trimester  Lab Results   Component Value Date    HGBA1C 5.1 2024     Recent Labs     24  2357 24  0105 24  0259 24  0515   POCGLU 100 105 88 103       Blood Sugar Average: Last 72 hrs:  (P) 101.5  - 2hr GTT 6wks postpartum  Gestational hypertension  Blood pressure monitoring    Systolic (12hrs), Av , Min:109 , Max:117   Diastolic (12hrs), Av, Min:69, Max:83    CBC wnl; CMP ordered 2024  Progress Note - OB/GYN   Kimberlyn Wills 29 y.o. female MRN: [id number]  Unit/Bed#: -01 Encounter: [id number]    Assessment:  Post partum Day #1 s/p  , stable, baby in the nursery      Subjective/Objective   Chief Complaint:     Post delivery. Patient is doing well. Lochia WNL. Pain well controlled.     Subjective:     Pain: yes, cramping, improved with meds  Tolerating PO: yes  Voiding: yes  Flatus: yes  Ambulating: yes  Chest pain: no  Shortness of breath: no  Leg pain: no  Lochia: minimal    Objective:     Vitals: /83 (BP Location: Left arm)   Pulse 79   Temp 97.6 °F (36.4 °C) (Oral)   Resp 18   Ht 5' 4\" (1.626 m)   Wt 96.2 kg (212 lb)   LMP 2024   SpO2 96%   Breastfeeding Yes   BMI 36.39 kg/m²     I/O          0701   0700  07 0700    P.O. 210     I.V. (mL/kg) 2103 (21.9) 700 (7.3)    IV Piggyback 250     Total Intake(mL/kg) 2563 (26.6) 700 (7.3)    " Urine (mL/kg/hr) 920 700 (0.3)    Emesis/NG output 150     Blood 184     Total Output 1254 700    Net +1309 0                  Lab Results   Component Value Date    WBC 10.57 (H) 11/02/2024    HGB 11.6 11/02/2024    HCT 33.6 (L) 11/02/2024    MCV 92 11/02/2024     11/02/2024       Physical Exam:     Gen: AAOx3, NAD  CV: no acute distress  Lungs: no acute distress  Abd: Soft, non-tender, non-distended, no rebound or guarding  Uterine fundus firm and non-tender, 1 cm below the umbilicus.  Ext: Non tender    Guillermo Mondragon MD  OB GYN PGY1  11/04/24  5:31 AM

## 2024-11-04 NOTE — PLAN OF CARE

## 2024-11-04 NOTE — PLAN OF CARE

## 2024-11-05 ENCOUNTER — TELEPHONE (OUTPATIENT)
Age: 29
End: 2024-11-05

## 2024-11-05 VITALS
TEMPERATURE: 99 F | OXYGEN SATURATION: 98 % | HEIGHT: 64 IN | WEIGHT: 212 LBS | BODY MASS INDEX: 36.19 KG/M2 | RESPIRATION RATE: 18 BRPM | DIASTOLIC BLOOD PRESSURE: 75 MMHG | HEART RATE: 80 BPM | SYSTOLIC BLOOD PRESSURE: 126 MMHG

## 2024-11-05 PROCEDURE — NC001 PR NO CHARGE: Performed by: STUDENT IN AN ORGANIZED HEALTH CARE EDUCATION/TRAINING PROGRAM

## 2024-11-05 PROCEDURE — 99024 POSTOP FOLLOW-UP VISIT: CPT | Performed by: STUDENT IN AN ORGANIZED HEALTH CARE EDUCATION/TRAINING PROGRAM

## 2024-11-05 RX ORDER — ACETAMINOPHEN 500 MG
500 TABLET ORAL EVERY 6 HOURS PRN
Start: 2024-11-05

## 2024-11-05 RX ORDER — SIMETHICONE 80 MG
80 TABLET,CHEWABLE ORAL 4 TIMES DAILY PRN
Start: 2024-11-05

## 2024-11-05 RX ORDER — IBUPROFEN 600 MG/1
600 TABLET, FILM COATED ORAL EVERY 8 HOURS PRN
Start: 2024-11-05

## 2024-11-05 RX ORDER — CALCIUM CARBONATE 500 MG/1
1000 TABLET, CHEWABLE ORAL 3 TIMES DAILY PRN
Start: 2024-11-05

## 2024-11-05 RX ORDER — BENZOCAINE/MENTHOL 6 MG-10 MG
1 LOZENGE MUCOUS MEMBRANE DAILY PRN
Start: 2024-11-05

## 2024-11-05 NOTE — PROGRESS NOTES
"Progress Note - OB/GYN   Name: Kimberlyn Wills 29 y.o. female I MRN: [id number]  Unit/Bed#: -01 I Date of Admission: 2024   Date of Service: 2024 I Hospital Day: 3     Assessment & Plan   (spontaneous vaginal delivery)  Patient progressing toward postpartum milestones.  - Continue routine postpartum care  - Pain management with oral analgesics  - Encourage breastfeeding, familial bonding  - Contraception: POPs     Bipolar 2 disorder, major depressive episode (HCC)  - continue home Prozac, Zyprexa  Tobacco use  - nicotine patch PRN  Insulin controlled gestational diabetes mellitus (GDM) in third trimester  Lab Results   Component Value Date    HGBA1C 5.1 2024     Recent Labs     24  2357 24  0105 24  0259 24  0515   POCGLU 100 105 88 103       Blood Sugar Average: Last 72 hrs:  (P) 101.5  - 2hr GTT 6wks postpartum  Gestational hypertension  Blood pressure monitoring    Systolic (24hrs), Av , Min:102 , Max:127   Diastolic (24hrs), Av, Min:56, Max:83  CBC, CMP wnl;     Progress Note - OB/GYN   Kimberlyn Wills 29 y.o. female MRN: [id number]  Unit/Bed#: -01 Encounter: [id number]    Assessment:  Post partum Day #2 s/p , stable, baby in the room. Baby is under blue light.     Subjective/Objective   Chief Complaint:     Post delivery. Patient is doing well. Lochia WNL. Pain well controlled.     Subjective:     Pain: yes, cramping, improved with meds  Tolerating PO: yes  Voiding: yes  Flatus: yes  Ambulating: yes  Chest pain: no  Shortness of breath: no  Leg pain: no  Lochia: minimal    Objective:     Vitals: /56   Pulse 70   Temp 97.9 °F (36.6 °C) (Oral)   Resp 18   Ht 5' 4\" (1.626 m)   Wt 96.2 kg (212 lb)   LMP 2024   SpO2 98%   Breastfeeding Yes   BMI 36.39 kg/m²     I/O          0701   0700  0701   07    I.V. (mL/kg) 700 (7.3)     Total Intake(mL/kg) 700 (7.3)     Urine (mL/kg/hr) 700 (0.3)     Total Output 700     Net " 0                   Lab Results   Component Value Date    WBC 10.57 (H) 11/02/2024    HGB 11.6 11/02/2024    HCT 33.6 (L) 11/02/2024    MCV 92 11/02/2024     11/02/2024       Physical Exam:     Gen: AAOx3, NAD  CV: no acute distress  Lungs: no acute distress  Abd: Soft, non-tender, non-distended, no rebound or guarding  Uterine fundus firm and non-tender, 2 cm below the umbilicus.  Ext: Non tender  Lower extremities: no edema, Elissa's negative, no pain.     Guillermo Mondragon MD  OB GYN PGY1  11/05/24  5:41 AM

## 2024-11-05 NOTE — LACTATION NOTE
This note was copied from a baby's chart.  CONSULT - LACTATION  Baby Boy (Adilson Wills 2 days male MRN: [id number]    [facility] AN NURSERY Room / Bed: (N)/(N) Encounter: [id number]    Maternal Information     MOTHER:  Kimberlyn Wills  Maternal Age: 29 y.o.  OB History: # 1 - Date: 06[birth year]30[birth year], Sex: Female, Weight: 2356 g (5 lb 3.1 oz), GA: 38w4d, Type: Vaginal, Spontaneous, Apgar1: 9, Apgar5: 9, Living: Living, Birth Comments: Thickened NT, marginal PCI and fetal IUGR with normal Dopplers found during pregnancy    # 2 - Date: 11/03/24, Sex: Male, Weight: 2710 g (5 lb 15.6 oz), GA: 36w6d, Type: Vaginal, Spontaneous, Apgar1: 2, Apgar5: 2, Living: Living, Birth Comments: None   Previouse breast reduction surgery? No    Lactation history:   Has patient previously breast fed: Yes   How long had patient previously breast fed: over 1 year with 6 yr old   Previous breast feeding complications: None     Past Surgical History:   Procedure Laterality Date    DENTAL IMPLANT  2024    ORTHOPEDIC SURGERY  2020    right knee    WISDOM TOOTH EXTRACTION         Birth information:  YOB: [birth year]24   Time of birth: 5:50 AM   Sex: male   Delivery type: Vaginal, Spontaneous   Birth Weight: 2710 g (5 lb 15.6 oz)   Percent of Weight Change: -13%     Gestational Age: 36w6d      11/05/24 1000   Lactation Consultation   Reason for Consult 10 minutes;10 minute;10 mins   Risk Factors LPI   Breasts/Nipples   Date Pumping Initiated 11/04/24   Intervention Breast pump;Lanolin   Breastfeeding Progress Not yet established   Other OB Lactation Tools   Feeding Devices Syringe;Feeding Cup;Finger Feeding;Supplemental Nursing System (SNS)   Patient Follow-Up   Lactation Consult Status 2   Follow-Up Type Inpatient;Call as needed   Other OB Lactation Documentation    Additional Problem Noted Observed Kimberlyn (mother) feeding baby expressed breast milk (currently pumping about 40 ml's per pumping session) via  syringe while stimulating Ajith to suck with a gloved finger. Offered SNS taped to finger for finger feeding for ease of control with feeding. Ajith is an LPI baby who has become too sleepy to latch. He is currently greater than 12 % weight loss. Demonstrated cup feeding at this time. Discussed risks and benefits of both methods. Encouraged skin to skin between feedings and hand expression to encourage feeding at the breast as Ajith increases in stamina.        Feeding recommendations:  pump every 2-3 hours    Encouraged skin to skin and feeding at the breast for about ten minutes when starting to feed. As baby looses stamina, follow up with feeding with preferred alternate feeding methods described and demonstrated above. Encouraged outpatient follow up for breastfeeding support.     Kimberlyn is an experienced breastfeeding mom who breast fed first child for 1.5 years with many challenges with poor latching in the beginning causing pain and nipple damage. She is receptive to post hospital lactation support. Offered to have outpatient call her for follow up. Kimberlyn declines, stating she will take care of this follow up step independently.    Reviewed diagrams on how to position baby in biological nurturing positioning. Kimberlyn reports increased confidence in making sure Ajith is getting enough volume.    Encouraged parents to call for assistance, questions, and concerns about breastfeeding.      Aria Street RN 11/5/2024 11:14 AM

## 2024-11-05 NOTE — TELEPHONE ENCOUNTER
Patient call because she just had a baby and would like to schedule an appointment for the new born and I call the office and no answered to confirm they accept new born. Please reach out to the patient if you do accept new born to register the patient thank you.

## 2024-11-05 NOTE — PLAN OF CARE

## 2024-11-05 NOTE — PLAN OF CARE
Problem: POSTPARTUM  Goal: Experiences normal postpartum course  Description: INTERVENTIONS:  - Monitor maternal vital signs  - Assess uterine involution and lochia  Outcome: Completed  Goal: Appropriate maternal -  bonding  Description: INTERVENTIONS:  - Identify family support  - Assess for appropriate maternal/infant bonding   -Encourage maternal/infant bonding opportunities  - Referral to  or  as needed  Outcome: Completed  Goal: Establishment of infant feeding pattern  Description: INTERVENTIONS:  - Assess breast/bottle feeding  - Refer to lactation as needed  Outcome: Completed  Goal: Incision(s), wounds(s) or drain site(s) healing without S/S of infection  Description: INTERVENTIONS  - Assess and document dressing, incision, wound bed, drain sites and surrounding tissue  - Provide patient and family education  - Perform skin care/dressing changes every   Outcome: Completed     Problem: PAIN - ADULT  Goal: Verbalizes/displays adequate comfort level or baseline comfort level  Description: Interventions:  - Encourage patient to monitor pain and request assistance  - Assess pain using appropriate pain scale  - Administer analgesics based on type and severity of pain and evaluate response  - Implement non-pharmacological measures as appropriate and evaluate response  - Consider cultural and social influences on pain and pain management  - Notify physician/advanced practitioner if interventions unsuccessful or patient reports new pain  Outcome: Completed     Problem: INFECTION - ADULT  Goal: Absence or prevention of progression during hospitalization  Description: INTERVENTIONS:  - Assess and monitor for signs and symptoms of infection  - Monitor lab/diagnostic results  - Monitor all insertion sites, i.e. indwelling lines, tubes, and drains  - Monitor endotracheal if appropriate and nasal secretions for changes in amount and color  - [place] appropriate cooling/warming therapies  per order  - Administer medications as ordered  - Instruct and encourage patient and family to use good hand hygiene technique  - Identify and instruct in appropriate isolation precautions for identified infection/condition  Outcome: Completed  Goal: Absence of fever/infection during neutropenic period  Description: INTERVENTIONS:  - Monitor WBC    Outcome: Completed     Problem: SAFETY ADULT  Goal: Patient will remain free of falls  Description: INTERVENTIONS:  - Educate patient/family on patient safety including physical limitations  - Instruct patient to call for assistance with activity   - Consult OT/PT to assist with strengthening/mobility   - Keep Call bell within reach  - Keep bed low and locked with side rails adjusted as appropriate  - Keep care items and personal belongings within reach  - Initiate and maintain comfort rounds  - Make Fall Risk Sign visible to staff  - Offer Toileting every  Hours, in advance of need  - Initiate/Maintain alarm  - Obtain necessary fall risk management equipment: - Apply yellow socks and bracelet for high fall risk patients  - Consider moving patient to room near nurses station  Outcome: Completed  Goal: Maintain or return to baseline ADL function  Description: INTERVENTIONS:  -  Assess patient's ability to carry out ADLs; assess patient's baseline for ADL function and identify physical deficits which impact ability to perform ADLs (bathing, care of mouth/teeth, toileting, grooming, dressing, etc.)  - Assess/evaluate cause of self-care deficits   - Assess range of motion  - Assess patient's mobility; develop plan if impaired  - Assess patient's need for assistive devices and provide as appropriate  - Encourage maximum independence but intervene and supervise when necessary  - Involve family in performance of ADLs  - Assess for home care needs following discharge   - Consider OT consult to assist with ADL evaluation and planning for discharge  - Provide patient education as  appropriate  Outcome: Completed  Goal: Maintains/Returns to pre admission functional level  Description: INTERVENTIONS:  - Perform AM-PAC 6 Click Basic Mobility/ Daily Activity assessment daily.  - Set and communicate daily mobility goal to care team and patient/family/caregiver.   - Collaborate with rehabilitation services on mobility goals if consulted  - Perform Range of Motion  times a day.  - Reposition patient every  hours.  - Dangle patient  times a day  - Stand patient  times a day  - Ambulate patient  times a day  - Out of bed to chair  times a day   - Out of bed for meals  times a day  - Out of bed for toileting  - Record patient progress and toleration of activity level   Outcome: Completed     Problem: Knowledge Deficit  Goal: Patient/family/caregiver demonstrates understanding of disease process, treatment plan, medications, and discharge instructions  Description: Complete learning assessment and assess knowledge base.  Interventions:  - Provide teaching at level of understanding  - Provide teaching via preferred learning methods  Outcome: Completed     Problem: DISCHARGE PLANNING  Goal: Discharge to home or other facility with appropriate resources  Description: INTERVENTIONS:  - Identify barriers to discharge w/patient and caregiver  - Arrange for needed discharge resources and transportation as appropriate  - Identify discharge learning needs (meds, wound care, etc.)  - Arrange for interpretive services to assist at discharge as needed  - Refer to Case Management Department for coordinating discharge planning if the patient needs post-hospital services based on physician/advanced practitioner order or complex needs related to functional status, cognitive ability, or social support system  Outcome: Completed

## 2024-11-05 NOTE — ASSESSMENT & PLAN NOTE
Blood pressure monitoring    Systolic (24hrs), Av , Min:102 , Max:127   Diastolic (24hrs), Av, Min:56, Max:83  CBC, CMP wnl;

## 2024-11-05 NOTE — LACTATION NOTE
This note was copied from a baby's chart.  Follow up lactation note:    Breasts/Nipples   Left Breast Soft   Right Breast Soft   Left Nipple Everted;Sore  (Redness around nipple)   Right Nipple Everted;Sore  (Redness around nipple)   Intervention Breast pump   Breastfeeding Progress Breastfeeding well   Other OB Lactation Tools   Feeding Devices Syringe;Pump   Patient Follow-Up   Lactation Consult Status 2   Follow-Up Type Inpatient;Call as needed   Other OB Lactation Documentation    Additional Problem Noted Mother reports breastfeeding has been going well. Reports pain with pumping, resized for flanges and provided 21mm inserts.     Mother reports breastfeeding has been going well but reports discomfort with pumping. Mom reports using the larger flange size but feels too much of the breast is being pulled into the flange. Mother noted to have soreness and redness around base of nipples. 25m flanges used for trial and mother reports it is more comfortable that the larger size, small amount of areola noted to be pulling into flange. 21mm inserts provided, mother reports similar in comfort to the 25mm inserts. Encouraged mother to use the flange that feels most comfortable and allows for only the nipple to move freely inside.     Mother denies additional questions at this time, ext provided for further questions.

## 2024-11-05 NOTE — ASSESSMENT & PLAN NOTE
Patient progressing toward postpartum milestones.  - Continue routine postpartum care  - Pain management with oral analgesics  - Encourage breastfeeding, familial bonding  - Contraception: POPs

## 2024-11-05 NOTE — PLAN OF CARE
Problem: POSTPARTUM  Goal: Experiences normal postpartum course  Description: INTERVENTIONS:  - Monitor maternal vital signs  - Assess uterine involution and lochia  Outcome: Adequate for Discharge  Goal: Appropriate maternal -  bonding  Description: INTERVENTIONS:  - Identify family support  - Assess for appropriate maternal/infant bonding   -Encourage maternal/infant bonding opportunities  - Referral to  or  as needed  Outcome: Adequate for Discharge  Goal: Establishment of infant feeding pattern  Description: INTERVENTIONS:  - Assess breast/bottle feeding  - Refer to lactation as needed  Outcome: Adequate for Discharge  Goal: Incision(s), wounds(s) or drain site(s) healing without S/S of infection  Description: INTERVENTIONS  - Assess and document dressing, incision, wound bed, drain sites and surrounding tissue  - Provide patient and family education  - Perform skin care/dressing changes every   Outcome: Adequate for Discharge     Problem: PAIN - ADULT  Goal: Verbalizes/displays adequate comfort level or baseline comfort level  Description: Interventions:  - Encourage patient to monitor pain and request assistance  - Assess pain using appropriate pain scale  - Administer analgesics based on type and severity of pain and evaluate response  - Implement non-pharmacological measures as appropriate and evaluate response  - Consider cultural and social influences on pain and pain management  - Notify physician/advanced practitioner if interventions unsuccessful or patient reports new pain  Outcome: Adequate for Discharge     Problem: INFECTION - ADULT  Goal: Absence or prevention of progression during hospitalization  Description: INTERVENTIONS:  - Assess and monitor for signs and symptoms of infection  - Monitor lab/diagnostic results  - Monitor all insertion sites, i.e. indwelling lines, tubes, and drains  - Monitor endotracheal if appropriate and nasal secretions for changes in  amount and color  - [place] appropriate cooling/warming therapies per order  - Administer medications as ordered  - Instruct and encourage patient and family to use good hand hygiene technique  - Identify and instruct in appropriate isolation precautions for identified infection/condition  Outcome: Adequate for Discharge  Goal: Absence of fever/infection during neutropenic period  Description: INTERVENTIONS:  - Monitor WBC    Outcome: Adequate for Discharge     Problem: SAFETY ADULT  Goal: Patient will remain free of falls  Description: INTERVENTIONS:  - Educate patient/family on patient safety including physical limitations  - Instruct patient to call for assistance with activity   - Consult OT/PT to assist with strengthening/mobility   - Keep Call bell within reach  - Keep bed low and locked with side rails adjusted as appropriate  - Keep care items and personal belongings within reach  - Initiate and maintain comfort rounds  - Make Fall Risk Sign visible to staff  - Offer Toileting every  Hours, in advance of need  - Initiate/Maintain alarm  - Obtain necessary fall risk management equipment:   - Apply yellow socks and bracelet for high fall risk patients  - Consider moving patient to room near nurses station  Outcome: Adequate for Discharge  Goal: Maintain or return to baseline ADL function  Description: INTERVENTIONS:  -  Assess patient's ability to carry out ADLs; assess patient's baseline for ADL function and identify physical deficits which impact ability to perform ADLs (bathing, care of mouth/teeth, toileting, grooming, dressing, etc.)  - Assess/evaluate cause of self-care deficits   - Assess range of motion  - Assess patient's mobility; develop plan if impaired  - Assess patient's need for assistive devices and provide as appropriate  - Encourage maximum independence but intervene and supervise when necessary  - Involve family in performance of ADLs  - Assess for home care needs following discharge   -  Consider OT consult to assist with ADL evaluation and planning for discharge  - Provide patient education as appropriate  Outcome: Adequate for Discharge  Goal: Maintains/Returns to pre admission functional level  Description: INTERVENTIONS:  - Perform AM-PAC 6 Click Basic Mobility/ Daily Activity assessment daily.  - Set and communicate daily mobility goal to care team and patient/family/caregiver.   - Collaborate with rehabilitation services on mobility goals if consulted  - Perform Range of Motion  times a day.  - Reposition patient every  hours.  - Dangle patient  times a day  - Stand patient  times a day  - Ambulate patient  times a day  - Out of bed to chair  times a day   - Out of bed for meals times a day  - Out of bed for toileting  - Record patient progress and toleration of activity level   Outcome: Adequate for Discharge     Problem: Knowledge Deficit  Goal: Patient/family/caregiver demonstrates understanding of disease process, treatment plan, medications, and discharge instructions  Description: Complete learning assessment and assess knowledge base.  Interventions:  - Provide teaching at level of understanding  - Provide teaching via preferred learning methods  Outcome: Adequate for Discharge     Problem: DISCHARGE PLANNING  Goal: Discharge to home or other facility with appropriate resources  Description: INTERVENTIONS:  - Identify barriers to discharge w/patient and caregiver  - Arrange for needed discharge resources and transportation as appropriate  - Identify discharge learning needs (meds, wound care, etc.)  - Arrange for interpretive services to assist at discharge as needed  - Refer to Case Management Department for coordinating discharge planning if the patient needs post-hospital services based on physician/advanced practitioner order or complex needs related to functional status, cognitive ability, or social support system  Outcome: Adequate for Discharge

## 2024-11-06 NOTE — UTILIZATION REVIEW
"    MOTHER AND BABY DISCHARGED     NOTIFICATION OF INPATIENT ADMISSION   MATERNITY/DELIVERY AUTHORIZATION REQUEST   SERVICING FACILITY:   [facility] - L&D, Walker, NICU  [address] [place] [address]  Tax ID: [id number]  NPI: [id number]   ATTENDING PROVIDER:  Attending Name and NPI#: Reshma Samson Md [[id number]]  Address: [address] [place] [address]  Phone: [phone]   ADMISSION INFORMATION:  Place of Service: Inpatient [facility]  Place of Service Code: 21  Inpatient Admission Date/Time: 24  2:25 PM  Discharge Date/Time: 2024  4:53 PM  Admitting Diagnosis Code/Description:  36 weeks gestation of pregnancy [Z3A.36]  Antepartum premature rupture of membrane [O42.90]  Encounter for full-term uncomplicated delivery [O80]     Mother: Kimberlyn Wills 1995 Estimated Date of Delivery: 24  Delivering clinician: Reshma Samson   OB History          2    Para   2    Term   1       1    AB        Living   2         SAB        IAB        Ectopic        Multiple   0    Live Births   2               Walker Name & MRN:   Information for the patient's :  Johann, Baby Boy (Kimberlyn) [[id number]]    Delivery Information:  Sex: male  Delivered 11/3/2024 5:50 AM by Vaginal, Spontaneous; Gestational Age: 36w6d    Walker Measurements:  Weight: 5 lb 15.6 oz (2710 g);  Height: 16.5\"    APGAR 1 minute 5 minutes 10 minutes   Totals: 2 2 8      UTILIZATION REVIEW CONTACT:  Marybel Ruiz Utilization   Network Utilization Review Department  Phone: [phone]  Fax [phone]  Email: [email or web address]  Contact for approvals/pending authorizations, clinical reviews, and discharge.     PHYSICIAN ADVISORY SERVICES:  Medical Necessity Denial & Wapm-ja-Wwbd Review  Phone: [phone]  Fax: [phone]  Email: [email or web address]     DISCHARGE SUPPORT TEAM:  For Patients " Discharge Needs & Updates  Phone: [phone] opt. 2 Fax: [phone]  Email: [email or web address]        NOTIFICATION OF ADMISSION DISCHARGE   This is a Notification of Discharge from [facility]. Please be advised that this patient has been discharge from our facility. Below you will find the admission and discharge date and time including the patient’s disposition.   UTILIZATION REVIEW CONTACT:  Henny Hinson  Utilization   Network Utilization Review Department  Phone: [phone] x carefully listen to the prompts. All voicemails are confidential.  Email: [email or web address]     ADMISSION INFORMATION  PRESENTATION DATE: 11/2/2024  1:07 PM  OBERVATION ADMISSION DATE: N/A  INPATIENT ADMISSION DATE: 11/2/24  2:25 PM   DISCHARGE DATE: 11/5/2024  4:53 PM   DISPOSITION:Home/Self Care    Network Utilization Review Department  ATTENTION: Please call with any questions or concerns to [phone] and carefully listen to the prompts so that you are directed to the right person. All voicemails are confidential.   For Discharge needs, contact Care Management DC Support Team at [phone] opt. 2  Send all requests for admission clinical reviews, approved or denied determinations and any other requests to dedicated fax number below belonging to the campus where the patient is receiving treatment. List of dedicated fax numbers for the Facilities:  FACILITY NAME UR FAX NUMBER   ADMISSION DENIALS (Administrative/Medical Necessity) [phone]   DISCHARGE SUPPORT TEAM ([facility]) [phone]   PARENT CHILD HEALTH (Maternity/NICU/Pediatrics) [phone]   [facility] [phone]   [facility] [phone]   [facility] [phone]   [facility] [phone]   [facility] [phone]   [facility] [phone]    [facility] [phone]   [facility] [phone]   [facility] [phone]   [facility] [phone]   [facility] [phone]   [facility] [phone]

## 2024-11-07 PROCEDURE — 88307 TISSUE EXAM BY PATHOLOGIST: CPT | Performed by: PATHOLOGY

## 2024-11-08 ENCOUNTER — OFFICE VISIT (OUTPATIENT)
Dept: POSTPARTUM | Facility: CLINIC | Age: 29
End: 2024-11-08

## 2024-11-08 ENCOUNTER — TELEPHONE (OUTPATIENT)
Dept: OBGYN CLINIC | Facility: CLINIC | Age: 29
End: 2024-11-08

## 2024-11-08 NOTE — TELEPHONE ENCOUNTER
Placed call to patient for postpartum assessment, left a non detailed message to return our call.

## 2024-11-08 NOTE — PROGRESS NOTES
INITIAL BREAST FEEDING EVALUATION    Informant/Relationship: Kimberlyn (mom/self), and MGM     Discussion of General Lactation Issues: Kimberlyn reports that in the hospital, Ajith was very sleepy in the hospital and needed to be syringe fed. She started pumping. As of last night, he was waking more and feeding better on the breast. She also did have nipple trauma from her first child nursing and would like to ensure this does not occur this time.     Infant is 5 days old today.        History:  Fertility Problem:no  Breast changes:yes - enlargement, tenderness, color changes   :  water broke, augmentation with pitocin, 18 hours of labor with pushing for 10-15 minutes   Full term:yes - 36 and 7    labor:yes - spontaneous   First nursing/attempt < 1 hour after birth:yes - latched for a minute, then got sleepy   Skin to skin following delivery:yes - immediately   Breast changes after delivery:yes - getting heavier the past few days   Rooming in (infant in room with mother with exception of procedures, eg. Circumcision: yes - entire stay   Blood sugar issues: checked but he passed   NICU stay:no  Jaundice:yes - bruising, elevated bili   Phototherapy:yes - blanket and light   Supplement given: (list supplement and method used as well as reason(s):no    Past Medical History:   Diagnosis Date    Anxiety     on prozac    Asthma     no meds, cildood    Bipolar 2 disorder (HCC)     dx at age 27; goes to [facility].    Diabetes mellitus (HCC)     gestational on insulin    Gestational diabetes     History of postpartum depression     Migraine     takes imitrex    Postpartum depression     no meds    Psychiatric disorder     depression and anxiety    Varicella     vaccinated         Current Outpatient Medications:     acetaminophen (TYLENOL) 500 mg tablet, Take 1 tablet (500 mg total) by mouth every 6 (six) hours as needed for mild pain or moderate pain, Disp: , Rfl:     benzocaine-menthol-lanolin-aloe  (DERMOPLAST) 20-0.5 % topical spray, Apply 1 Application topically every 6 (six) hours as needed for mild pain or irritation, Disp: , Rfl:     Blood Glucose Monitoring Suppl (OneTouch Verio Flex System) w/Device KIT, Dispense 1 kit per insurance formulary., Disp: 1 kit, Rfl: 0    calcium carbonate (TUMS) 500 mg chewable tablet, Chew 2 tablets (1,000 mg total) 3 (three) times a day as needed for indigestion or heartburn, Disp: , Rfl:     FLUoxetine (PROzac) 20 mg capsule, Take 20 mg by mouth daily, Disp: , Rfl:     hydrocortisone 1 % cream, Apply 1 Application topically daily as needed for irritation or rash, Disp: , Rfl:     ibuprofen (MOTRIN) 600 mg tablet, Take 1 tablet (600 mg total) by mouth every 8 (eight) hours as needed for mild pain or moderate pain (alternate with Tylenol), Disp: , Rfl:     Insulin Pen Needle 31G X 5 MM MISC, Use one with each insulin pen injection., Disp: 100 each, Rfl: 1    nicotine polacrilex (COMMIT) 2 MG lozenge, , Disp: , Rfl:     OLANZapine (ZyPREXA) 5 mg tablet, Take 7.5 mg by mouth, Disp: , Rfl:     OneTouch Delica Lancets 33G MISC, Use 4 a day or as directed., Disp: 100 each, Rfl: 5    Prenatal Multivit-Min-Fe-FA (PRE-FRIDA PO), Take by mouth, Disp: , Rfl:     simethicone (MYLICON) 80 mg chewable tablet, Chew 1 tablet (80 mg total) 4 (four) times a day as needed for flatulence, Disp: , Rfl:     witch hazel-glycerin (TUCKS) topical pad, Apply 1 Pad topically every 4 (four) hours as needed for irritation, Disp: , Rfl:     No Known Allergies    Social History     Substance and Sexual Activity   Drug Use Yes    Types: Marijuana    Comment: has medical marijuana use. has been using sporadically in pregnancy. will review with MFM per patient       Social History     Interval Breastfeeding History:    Frequency of breast feeding: every 2-3.5 hours   Does mother feel breastfeeding is effective: Yes  Does infant appear satisfied after nursing:Yes  Stooling pattern normal: lYes  Urinating  frequently:Yes  Using shield or shells: No    Alternative/Artificial Feedings:   Bottle: No  Cup: No  Syringe/Finger: Yes, when he was too sleepy to latch            Formula Type: no                     Amount: n/a            Breast Milk:                      Amount: 25-30 mLs after nursing             Frequency Q no longer regularly syringe feeding since he start to be more awake on the breast   Elimination Problems: No      Equipment:    Pump            Type: Spectra S2             Frequency of Use: she was to collect milk when syringe feeding, no longer regularly pumping       Equipment Problems: no    Mom:  Breast: Normal, rounded, close spacing, mild engorgement.   Nipple Assessment in General: Normal: elongated/eraser, no discoloration and no damage noted.  Mother's Awareness of Feeding Cues                 Recognizes: Yes                  Verbalizes: Yes  Support System: good support   History of Breastfeeding: Latching pain, nipple damage throughout the breastfeeding journey. She was having bleeding, and scarring. She nursed her for over 1 year.   Changes/Stressors/Violence: Working on getting him more awake on the breast, this is improving. He doesn't open super wide, the right nipple is sore.   Concerns/Goals: To have a more comfortable breastfeeding experience. She'd like to exclusively and directly breastfeed most often.     Problems with Mom: nipple pain    Physical Exam  Constitutional:       Appearance: Normal appearance.   HENT:      Head: Normocephalic.      Nose: Nose normal.   Pulmonary:      Effort: Pulmonary effort is normal.   Musculoskeletal:         General: Normal range of motion.      Cervical back: Normal range of motion.   Neurological:      General: No focal deficit present.      Mental Status: She is alert and oriented to person, place, and time.   Skin:     Capillary Refill: Capillary refill takes less than 2 seconds.   Psychiatric:         Mood and Affect: Mood normal.          Behavior: Behavior normal.         Thought Content: Thought content normal.         Judgment: Judgment normal.       Infant:  Behaviors: Alert  Color: Jaundice  Birth weight: 2710 g   Current weight: 2335 g     Problems with infant: sleepy at the breast, preemie, jaundiced, 13% weight loss       General Appearance:  Alert, active, no distress                             Head:  Normocephalic, prominent forehead, AFOF, wide anterior fontanelle, sutures opposed. Facial bruising                              Eyes:  Conjunctiva clear, no drainage                              Ears:  Normally placed, no anomolies                             Nose:  Septum intact, no drainage or erythema                           Mouth:  No lesions. Tongue is at rest at the roof of his mouth, body of tongue lateralizes, tip remains midline, Full cup on gloved finger, lips flanged, but after 1-2 sucks he releases suction. Manual lift shows rounded tongue tip with frenulum connecting at the floor of the mouth and well behind the tongue tip.                     Neck:  Supple, symmetrical, trachea midline                 Respiratory:  No grunting, flaring, retractions, breath sounds clear and equal            Cardiovascular:  Regular rate and rhythm. No murmur. Adequate perfusion/capillary refill. Femoral pulse present                    Abdomen:   Soft, non-tender, no masses, bowel sounds present, no HSM             Genitourinary:  Normal male, testes descended, no discharge, swelling, or pain, anus patent                          Spine:   No abnormalities noted        Musculoskeletal:  Full range of motion          Skin/Hair/Nails:   Skin warm, dry, and intact, jaundice,  rash                 Neurologic:   No abnormal movement, tone appropriate for gestational age. Hyper reflexes     Latch:  Efficiency:               Lips Flanged: Yes              Depth of latch: wide               Audible Swallow: Yes, when sucking, but he is  very sleepy.               Visible Milk: Yes, hand expressed               Wide Open/ Asymmetrical: Yes              Suck Swallow Cycle: Breathing: yes, Coordinated: yes  Nipple Assessment after latch: Normal: elongated/eraser, no discoloration and no damage noted.  Latch Problems: He is very sleepy for this feeding. A few bursts of active sucking and swallowing noted. He appears to have a wide attachment. Mom reports suction, but denies pain.     He nurses on both breasts, some active feeding bursts. He is +20 g after nursing.     Position:  Infant's Ergonomics/Body               Body Alignment: Yes               Head Supported: Yes               Close to Mom's body/ Lifted/ Supported: Yes               Mom's Ergonomics/Body: Yes                           Supported: Yes                           Sitting Back: Yes                           Brings Baby to her breast: Yes  Positioning Problems: Reviewed BN hold, mom returns demonstration well.         Education:  Reviewed Latch: importance of deep latch without pain.   Reviewed Positioning for Dyad: proper alignment and head angle when positioning at the breast   Reviewed Frequency/Supply & Demand: offer the breast at each feeding, pump if baby is not latching and effective transferring milk.   Reviewed Infant:Cues and varied States of Awareness: watch for hunger cues, feed on demand. If baby seems satisfied at the breast (calm, relaxed sleeping, breasts are softer) no need to pump or supplement   Reviewed Infant Elimination: goal of 6+ wets and 2-3 stools per day   Reviewed Alternative/Artificial Feedings: paced bottle feeding technique demonstrated  Reviewed Mom/Breast care: gentle handling of the breast at all times, discussed lymphatic drainage and reverse pressure softening, as well as tips for healing sore nipples.    Reviewed Equipment: Hand pump and electric pump general guidance, Discussed proper flange fit, how to measure        Plan:      Reassurance  provided, acknowledged Mom's goals and commitment to breastfeeding. Cont with positioning adjustments and watch for signs of effective feeding when on the breast, switch breasts throughout to keep him active. Pump using hand expression, manual pump, or dual electric pump if wanting to replace feeding at the breast with bottle feeding or if latching becomes painful. Gentle handling of the breast at all times to preserve integrity.  Contact [facility] for breastfeeding support as needed or ongoing concerns with latching comfort and milk transfer.     I have spent 60 minutes with Patient and family today in which greater than 50% of this time was spent in counseling/coordination of care regarding Patient and family education.

## 2024-11-08 NOTE — PATIENT INSTRUCTIONS
-Great meeting with you and your sweet baby boy today!   -Offer him the breast on demand, at least every 2-3 hours. Watch for signs throughout the feeding that baby is effectively removing milk. You will feel strong tugging, hear swallowing and will feel your breasts get softer after nursing.   -Switch breasts when he shows decreased active drinking, offer up to four breasts per feeding. Keep him moving to optimize his activity and preserve his energy.   -No need to pump if baby is latching and feeding well at the breast on demand.   -Pump only as needed for missed feedings at the breast or for uncomfortable engorgement, utilize flange fit and settings that are comfortable for you, pumping should not be painful!    -I recommend hand pumping   - Storing and Thawing Breast Milk  [facility] Breastfeeding Support ([place]da.gov)    -Please call or scheduled a follow up appointment with lactation as needed for questions or concerns you may have.  Call for follow up if he is not having more active feedings in the next few weeks, or if there is every latching pain or nipple damage.

## 2024-11-12 NOTE — PROGRESS NOTES
Post-Partum Checkup Visit    Kimberlyn Wills is a 29 y.o.  female     Assessment:  Delivered a male  (6lb 0oz) via  with epidural (no lac) on 11/3/24 after admission for SROM, gHTN, and A2GDM, doing well today.     She presents today for BP monitoring. She does not monitor BP at home. She denies PIH symptoms.    Plan:  Breastfeeding: continue working with lactation  Activity: continue restricted/limited activity until 6 weeks.  Reviewed PIH symptoms; advised to monitor and call if symptoms arise for BP evaluation.  RTO for scheduled postpartum appt and PRN    Problem List Items Addressed This Visit        (spontaneous vaginal delivery) - Primary    Gestational hypertension       Subjective/Objective     Subjective:   Pain: no  Tolerating Oral Intake: yes  Voiding: yes  Flatus: yes  Bowel Movement: yes  Ambulating: yes  Breastfeeding: Breastfeeding,  is sleepy; she is working with lactation.  Chest Pain: no  Shortness of Breath: no  Leg Pain/Discomfort: no  Lochia: normal     Objective:     Postpartum Depression: Medium Risk (2024)    [place]  Depression Scale     Last EPDS Total Score: 7     Last EPDS Self Harm Result: Never       Vitals:  Vitals:    11/15/24 1006   BP: 132/86       Physical Exam:  Physical Exam  Vitals and nursing note reviewed.   Constitutional:       General: She is not in acute distress.     Appearance: Normal appearance.   HENT:      Head: Normocephalic.   Eyes:      Conjunctiva/sclera: Conjunctivae normal.   Cardiovascular:      Rate and Rhythm: Normal rate and regular rhythm.      Heart sounds: Normal heart sounds.   Pulmonary:      Effort: Pulmonary effort is normal.      Breath sounds: Normal breath sounds.   Abdominal:      General: Abdomen is flat.      Palpations: Abdomen is soft.      Tenderness: There is no right CVA tenderness or left CVA tenderness.   Musculoskeletal:         General: Normal range of motion.      Cervical back: Normal range of  motion.      Right lower leg: No edema.      Left lower leg: No edema.   Lymphadenopathy:      Cervical: No cervical adenopathy.   Skin:     General: Skin is warm and dry.   Neurological:      Mental Status: She is alert and oriented to person, place, and time.   Psychiatric:         Mood and Affect: Mood normal.         Behavior: Behavior normal.         Thought Content: Thought content normal.         Judgment: Judgment normal.           OB Hx:  OB History    Para Term  AB Living   2 2 1 1 0 2   SAB IAB Ectopic Multiple Live Births   0 0 0 0 2      # Outcome Date GA Lbr Felipe/2nd Weight Sex Type Anes PTL Lv   2  24 36w6d / 00:19 2710 g (5 lb 15.6 oz) M Vag-Spont EPI Y KIT      Name: Ajith Mello      Apgar1: 2  Apgar5: 2   1 Term 18 38w4d / 00:09 2356 g (5 lb 3.1 oz) F Vag-Spont EPI, Local  KIT      Birth Comments: Thickened NT, marginal PCI and fetal IUGR with normal Dopplers found during pregnancy      Complications: Abruptio Placenta      Name: LAST GR      Apgar1: 9  Apgar5: 9     Medical Hx  Past Medical History:   Diagnosis Date    Anxiety     on prozac    Asthma     no meds, cildood    Bipolar 2 disorder (HCC)     dx at age 27; goes to [facility].    Diabetes mellitus (HCC)     gestational on insulin    Gestational diabetes     History of postpartum depression     Migraine     takes imitrex    Postpartum depression     no meds    Psychiatric disorder     depression and anxiety    Varicella     vaccinated     Surgical Hx  Past Surgical History:   Procedure Laterality Date    DENTAL IMPLANT      ORTHOPEDIC SURGERY      right knee    WISDOM TOOTH EXTRACTION       Family Hx  Family History   Problem Relation Age of Onset    Hypothyroidism Mother     Depression Mother     No Known Problems Father     Depression Sister     Depression Brother      Social Hx  Social History     Socioeconomic History    Marital status: Single     Spouse name:  Not on file    Number of children: Not on file    Years of education: Not on file    Highest education level: Not on file   Occupational History    Not on file   Tobacco Use    Smoking status: Every Day     Types: Cigarettes     Start date: 2012     Passive exposure: Current    Smokeless tobacco: Never    Tobacco comments:     Currently 2 cigarettes per day     Weaning- working on stopping completely and start nicotine patch   Vaping Use    Vaping status: Former    Substances: Nicotine, THC, Flavoring   Substance and Sexual Activity    Alcohol use: Not Currently     Comment: pregnant    Drug use: Not Currently     Types: Marijuana     Comment: has medical marijuana use. has been using sporadically in pregnancy. will review with MFM per patient    Sexual activity: Not Currently     Partners: Male     Comment: pregnant   Other Topics Concern    Not on file   Social History Narrative    Not on file     Social Drivers of Health     Financial Resource Strain: Not on file   Food Insecurity: No Food Insecurity (11/2/2024)    Nursing - Inadequate Food Risk Classification     Worried About Running Out of Food in the Last Year: Never true     Ran Out of Food in the Last Year: Never true     Ran Out of Food in the Last Year: 1   Transportation Needs: No Transportation Needs (11/2/2024)    Nursing - Transportation Risk Classification     Lack of Transportation: Not on file     Lack of Transportation: 2   Physical Activity: Not on file   Stress: Not on file   Social Connections: Not on file   Intimate Partner Violence: Unknown (11/2/2024)    Nursing IPS     Feels Physically and Emotionally Safe: Not on file     Physically Hurt by Someone: Not on file     Humiliated or Emotionally Abused by Someone: Not on file     Physically Hurt by Someone: 2     Hurt or Threatened by Someone: 2   Housing Stability: Unknown (11/2/2024)    Nursing: Inadequate Housing Risk Classification     Has Housing: Not on file     Worried About Losing  Housing: Not on file     Unable to Get Utilities: Not on file     Unable to Pay for Housing in the Last Year: 2     Has Housin     Allergies  No Known Allergies    Luana NORRIS  OB/GYN  11/15/2024  10:21 AM

## 2024-11-15 ENCOUNTER — OFFICE VISIT (OUTPATIENT)
Dept: OBGYN CLINIC | Facility: CLINIC | Age: 29
End: 2024-11-15

## 2024-11-15 VITALS
SYSTOLIC BLOOD PRESSURE: 132 MMHG | HEIGHT: 64 IN | DIASTOLIC BLOOD PRESSURE: 86 MMHG | WEIGHT: 198 LBS | BODY MASS INDEX: 33.8 KG/M2

## 2024-11-15 DIAGNOSIS — O13.9 GESTATIONAL HYPERTENSION, ANTEPARTUM: ICD-10-CM

## 2024-11-15 PROCEDURE — PNV

## 2024-11-25 ENCOUNTER — POSTPARTUM VISIT (OUTPATIENT)
Dept: OBGYN CLINIC | Facility: CLINIC | Age: 29
End: 2024-11-25

## 2024-11-25 VITALS
SYSTOLIC BLOOD PRESSURE: 116 MMHG | HEIGHT: 64 IN | WEIGHT: 203 LBS | BODY MASS INDEX: 34.66 KG/M2 | DIASTOLIC BLOOD PRESSURE: 72 MMHG

## 2024-11-25 DIAGNOSIS — R87.610 ASCUS OF CERVIX WITH NEGATIVE HIGH RISK HPV: ICD-10-CM

## 2024-11-25 PROBLEM — O99.212 OBESITY COMPLICATING PREGNANCY IN SECOND TRIMESTER: Status: RESOLVED | Noted: 2024-06-20 | Resolved: 2024-11-25

## 2024-11-25 PROBLEM — O24.414 INSULIN CONTROLLED GESTATIONAL DIABETES MELLITUS (GDM) IN THIRD TRIMESTER: Status: RESOLVED | Noted: 2024-06-20 | Resolved: 2024-11-25

## 2024-11-25 PROBLEM — R10.9 ABDOMINAL PAIN DURING PREGNANCY IN THIRD TRIMESTER: Status: RESOLVED | Noted: 2024-10-17 | Resolved: 2024-11-25

## 2024-11-25 PROBLEM — Z34.93 PRENATAL CARE IN THIRD TRIMESTER: Status: RESOLVED | Noted: 2024-10-15 | Resolved: 2024-11-25

## 2024-11-25 PROBLEM — O41.8X90: Status: RESOLVED | Noted: 2024-11-01 | Resolved: 2024-11-25

## 2024-11-25 PROBLEM — O40.3XX0 POLYHYDRAMNIOS IN THIRD TRIMESTER: Status: RESOLVED | Noted: 2024-10-08 | Resolved: 2024-11-25

## 2024-11-25 PROBLEM — O09.293 HISTORY OF IUGR (INTRAUTERINE GROWTH RETARDATION) AND STILLBIRTH, CURRENTLY PREGNANT, THIRD TRIMESTER: Status: RESOLVED | Noted: 2024-05-18 | Resolved: 2024-11-25

## 2024-11-25 PROBLEM — O47.9 UTERINE CONTRACTIONS: Status: RESOLVED | Noted: 2024-09-25 | Resolved: 2024-11-25

## 2024-11-25 PROBLEM — O13.9 GESTATIONAL HYPERTENSION: Status: RESOLVED | Noted: 2024-11-04 | Resolved: 2024-11-25

## 2024-11-25 PROBLEM — O26.893 ABDOMINAL PAIN DURING PREGNANCY IN THIRD TRIMESTER: Status: RESOLVED | Noted: 2024-10-17 | Resolved: 2024-11-25

## 2024-11-25 PROCEDURE — 99024 POSTOP FOLLOW-UP VISIT: CPT | Performed by: STUDENT IN AN ORGANIZED HEALTH CARE EDUCATION/TRAINING PROGRAM

## 2024-11-25 RX ORDER — NORETHINDRONE ACETATE AND ETHINYL ESTRADIOL 1MG-20(21)
1 KIT ORAL DAILY
Qty: 84 TABLET | Refills: 3 | Status: SHIPPED | OUTPATIENT
Start: 2024-12-15 | End: 2025-11-16

## 2024-11-25 NOTE — PROGRESS NOTES
Postpartum Office Visit    Patient Name: Kimberlyn Wills  Patient MRN:  [id number]  Date:  24   : 1995  Service: Ob/Gyn      Assessment & Plan     Kimberlyn Wills is a 29 y.o.  female  Presents for postpartum visit.    - Birth control- OCP rx given to start at 6 weeks   - Last pap smear: 2023 NILM   - GDMA2 - Plan for 2 hour OGTT at 6 wks postpartum. Script given.   - Encouraged continued breastfeeding  - Sexual activity can be resumed after 6 weeks postpartum.   - Return to office for next annual exam in 1 year or PRN.   - reviewed increased risk of SIDs with tobacco use. Reviewed resources available. Patient is currently using lozenges. Has rx for patch but has not yet started.     Problem List       ASCUS of cervix with negative high risk HPV    Overview   Pap 17  NILM          Bipolar 2 disorder, major depressive episode (HCC) (Chronic)    Overview   Last Assessment & Plan:    Formatting of this note might be different from the original.   Patient describes hypomanic symptoms emerging. Weight gain with Zyprexa. Discussed switching to different SGA vs augmenting with metformin. Perhaps SSRI could be contributing to mood instability.       -Increase Zyprexa from 5mg nightly to 7.5mg nightly for mood stabilization.    -Continue Prozac 20mg daily for depression and anxiety. Can be increased at next appointment if needed.    -Trial of melatonin 3-5mg QHS to help stabilize circadian rhythm   -Risks, benefits, alternative treatments and potential side effects of all medications were discussed with the patient who verbalized understanding.  All questions were answered.   -The patient is not an acute danger to themself or others at this time   -Return to clinic in 1 month         Dislocation of right patella    Generalized anxiety disorder (Chronic)    Overview   Formatting of this note might be different from the original.   stopped medication       Last Assessment & Plan:    Formatting  of this note might be different from the original.   Patient describes hypomanic symptoms emerging. Weight gain with Zyprexa. Discussed switching to different SGA vs augmenting with metformin. Perhaps SSRI could be contributing to mood instability.       -Increase Zyprexa from 5mg nightly to 7.5mg nightly for mood stabilization.    -Continue Prozac 20mg daily for depression and anxiety. Can be increased at next appointment if needed.    -Trial of melatonin 3-5mg QHS to help stabilize circadian rhythm   -Risks, benefits, alternative treatments and potential side effects of all medications were discussed with the patient who verbalized understanding.  All questions were answered.   -The patient is not an acute danger to themself or others at this time   -Return to clinic in 1 month    stopped medication          History of drug use    Incomplete RBBB    Osteochondral defect of femoral condyle    Seasonal allergies    Tobacco use    Overview   Last Assessment & Plan:    Formatting of this note might be different from the original.   She has switched to nicotine vaporizer instead of cigarettes; still trying to cut back on vape use.         Medical marijuana use    Overview   During pregnancy          (spontaneous vaginal delivery)    BMI 35.0-35.9,adult       Subjective    Kimberlyn Wills is a 29 y.o.  female seen today in clinic for postpartum visit.    11/3/24   Patient is doing well since the delivery of her baby.    Vaginal bleeding: minimal   Bowel movement: normal   Breastfeeding: no issues- received help through [facility]   Pain control: well controlled   Postpartum depression:   Postpartum Depression: Medium Risk (2024)    [place]  Depression Scale     Last EPDS Total Score: 6     Last EPDS Self Harm Result: Never   Birth control: plans for OCPs- pill given to start at 6 weeks     Denies fever, chills, CP, palpitations, SOB, lower extremity swelling, dysuria, constipation.  "    Review of Systems  A 10 point review of systems was performed and was otherwise unrevealing for pertinent positives or negatives except as noted above.      Objective     /72 (BP Location: Left arm, Patient Position: Sitting, Cuff Size: Standard)   Ht 5' 4\" (1.626 m)   Wt 92.1 kg (203 lb)   LMP 02/19/2024   Breastfeeding Yes   BMI 34.84 kg/m²      Physical Exam  Constitutional:       Appearance: Normal appearance. She is well-developed. She is not ill-appearing or diaphoretic.   Genitourinary:      Genitourinary Comments: Normal perineum    HENT:      Head: Normocephalic and atraumatic.   Eyes:      Extraocular Movements: Extraocular movements intact.      Conjunctiva/sclera: Conjunctivae normal.   Cardiovascular:      Rate and Rhythm: Normal rate.   Abdominal:      General: There is no distension.      Tenderness: There is no abdominal tenderness. There is no guarding or rebound.   Musculoskeletal:      Cervical back: Normal range of motion and neck supple.   Neurological:      Mental Status: She is alert and oriented to person, place, and time.      Motor: Motor function is intact.   Psychiatric:         Mood and Affect: Mood and affect normal.            Uterus: firm, under umbilicus, no fundal tenderness     Hoa Armenta MD 11/25/24 10:10 AM    "

## 2025-04-01 ENCOUNTER — OFFICE VISIT (OUTPATIENT)
Dept: FAMILY MEDICINE CLINIC | Facility: CLINIC | Age: 30
End: 2025-04-01
Payer: COMMERCIAL

## 2025-04-01 VITALS
SYSTOLIC BLOOD PRESSURE: 110 MMHG | OXYGEN SATURATION: 96 % | HEART RATE: 115 BPM | HEIGHT: 64 IN | TEMPERATURE: 96.9 F | BODY MASS INDEX: 35.85 KG/M2 | DIASTOLIC BLOOD PRESSURE: 72 MMHG | WEIGHT: 210 LBS

## 2025-04-01 DIAGNOSIS — Z13.0 SCREENING FOR DEFICIENCY ANEMIA: ICD-10-CM

## 2025-04-01 DIAGNOSIS — R53.82 CHRONIC FATIGUE: ICD-10-CM

## 2025-04-01 DIAGNOSIS — Z00.00 ANNUAL PHYSICAL EXAM: Primary | ICD-10-CM

## 2025-04-01 DIAGNOSIS — Z86.32 HISTORY OF GESTATIONAL DIABETES: ICD-10-CM

## 2025-04-01 DIAGNOSIS — F31.81 BIPOLAR 2 DISORDER, MAJOR DEPRESSIVE EPISODE (HCC): Chronic | ICD-10-CM

## 2025-04-01 DIAGNOSIS — M72.2 PLANTAR FASCIITIS, BILATERAL: ICD-10-CM

## 2025-04-01 DIAGNOSIS — E78.2 MIXED HYPERLIPIDEMIA: ICD-10-CM

## 2025-04-01 PROBLEM — D64.89 OTHER SPECIFIED ANEMIAS: Status: ACTIVE | Noted: 2025-04-01

## 2025-04-01 PROCEDURE — 99395 PREV VISIT EST AGE 18-39: CPT | Performed by: FAMILY MEDICINE

## 2025-04-01 PROCEDURE — 99213 OFFICE O/P EST LOW 20 MIN: CPT | Performed by: FAMILY MEDICINE

## 2025-04-01 RX ORDER — OLANZAPINE 5 MG/1
7.5 TABLET ORAL
Qty: 45 TABLET | Refills: 0 | Status: SHIPPED | OUTPATIENT
Start: 2025-04-01

## 2025-04-01 NOTE — PROGRESS NOTES
ADULT ANNUAL PHYSICAL  [facility]    NAME: Kimberlyn Wills  AGE: 29 y.o. SEX: female  : 1995     DATE: 2025     Assessment and Plan:     1. Annual physical exam  2. Plantar fasciitis, bilateral  Assessment & Plan:  Discussed treatment and will consider physical therapy and podiatry evaluation  Since she has done stretches and shoe inserts  Orders:  -     Ambulatory Referral to Physical Therapy; Future  -     Ambulatory Referral to Podiatry; Future  3. Bipolar 2 disorder, major depressive episode (HCC)  Assessment & Plan:  Pt is transitioning psychiatrist , requesting renewal of medication until her transition to new psychiatrist  Orders:  -     FLUoxetine (PROzac) 20 mg capsule; Take 1 capsule (20 mg total) by mouth daily  -     OLANZapine (ZyPREXA) 5 mg tablet; Take 1.5 tablets (7.5 mg total) by mouth daily at bedtime  4. Mixed hyperlipidemia  -     Lipid Panel with Direct LDL reflex  5. Chronic fatigue  -     Hemoglobin A1c (w/out EAG); Future  -     Hemoglobin A1c (w/out EAG)  -     Comprehensive metabolic panel; Future  -     TSH, 3rd generation with Free T4 reflex; Future  -     Comprehensive metabolic panel  -     TSH, 3rd generation with Free T4 reflex  6. History of gestational diabetes  -     Hemoglobin A1c (w/out EAG); Future  -     Hemoglobin A1c (w/out EAG)  -     Comprehensive metabolic panel; Future  -     Comprehensive metabolic panel  7. Screening for deficiency anemia  8. BMI 36.0-36.9,adult      Immunizations and preventive care screenings were discussed with patient today. Appropriate education was printed on patient's after visit summary.    Counseling:  Dental Health: discussed importance of regular tooth brushing, flossing, and dental visits.  Exercise: the importance of regular exercise/physical activity was discussed. Recommend exercise 3-5 times per week for at least 30 minutes.          No follow-ups on file.     Chief  Complaint:     Chief Complaint   Patient presents with    Physical Exam     Patient is here for an annual physical. She has a pain in her feet but worse in right foot. She has had it for years but getting worse.       History of Present Illness:     Pt is here for a physical today. Complains of pain in bilateral feet for many years but worse recently, right worse than left   Transitioning psychiatrist- requesting renewal of medication until she establishes with new psychiatrist.          Review of Systems:     Review of Systems   Constitutional: Negative.  Negative for fatigue and fever.   HENT: Negative.     Eyes: Negative.    Respiratory: Negative.  Negative for cough.    Cardiovascular: Negative.    Gastrointestinal: Negative.    Endocrine: Negative.    Genitourinary: Negative.    Musculoskeletal: Negative.    Skin: Negative.    Allergic/Immunologic: Negative.    Neurological: Negative.    Psychiatric/Behavioral: Negative.        Past Medical History:     Past Medical History:   Diagnosis Date    Anxiety     on prozac    Asthma     no meds, cildood    Bipolar 2 disorder (HCC)     dx at age 27; goes to [facility].    Diabetes mellitus (HCC)     gestational on insulin    Gestational diabetes 2024    Gestational hypertension 11/04/2024    History of IUGR (intrauterine growth retardation) and stillbirth, currently pregnant, third trimester 05/18/2024    History of postpartum depression     Insulin controlled gestational diabetes mellitus (GDM) in third trimester 06/20/2024    Migraine     takes imitrex    Postpartum depression     no meds    Psychiatric disorder     depression and anxiety    Varicella     vaccinated      Past Surgical History:     Past Surgical History:   Procedure Laterality Date    DENTAL IMPLANT  2024    ORTHOPEDIC SURGERY  2020    right knee    WISDOM TOOTH EXTRACTION        Social History:     Social History     Socioeconomic History    Marital status: Single     Spouse name: None     Number of children: None    Years of education: None    Highest education level: None   Occupational History    None   Tobacco Use    Smoking status: Every Day     Types: Cigarettes     Start date: 2012     Passive exposure: Current    Smokeless tobacco: Never    Tobacco comments:     Currently 2 cigarettes per day     Weaning- working on stopping completely and start nicotine patch   Vaping Use    Vaping status: Former    Substances: Nicotine, THC, Flavoring   Substance and Sexual Activity    Alcohol use: Not Currently     Comment: pregnant    Drug use: Not Currently     Types: Marijuana     Comment: has medical marijuana use. has been using sporadically in pregnancy. will review with MFM per patient    Sexual activity: Not Currently     Partners: Male     Comment: recovering from pregnancy   Other Topics Concern    None   Social History Narrative    None     Social Drivers of Health     Financial Resource Strain: Not on file   Food Insecurity: No Food Insecurity (3/29/2025)    Hunger Vital Sign     Worried About Running Out of Food in the Last Year: Never true     Ran Out of Food in the Last Year: Never true   Transportation Needs: No Transportation Needs (3/29/2025)    PRAPARE - Transportation     Lack of Transportation (Medical): No     Lack of Transportation (Non-Medical): No   Physical Activity: Not on file   Stress: Not on file   Social Connections: Not on file   Intimate Partner Violence: Unknown (11/2/2024)    Nursing IPS     Feels Physically and Emotionally Safe: Not on file     Physically Hurt by Someone: Not on file     Humiliated or Emotionally Abused by Someone: Not on file     Physically Hurt by Someone: No     Hurt or Threatened by Someone: No   Housing Stability: Low Risk  (3/29/2025)    Housing Stability Vital Sign     Unable to Pay for Housing in the Last Year: No     Number of Times Moved in the Last Year: 0     Homeless in the Last Year: No      Family History:     Family History   Problem  "Relation Age of Onset    Hypothyroidism Mother     Depression Mother     No Known Problems Father     Depression Sister     Depression Brother       Current Medications:     Current Outpatient Medications   Medication Sig Dispense Refill    FLUoxetine (PROzac) 20 mg capsule Take 1 capsule (20 mg total) by mouth daily 30 capsule 0    norethindrone-ethinyl estradiol (Loestrin Fe ) 1-20 MG-MCG per tablet Take 1 tablet by mouth daily Do not start before December 15, 2024. 84 tablet 3    OLANZapine (ZyPREXA) 5 mg tablet Take 1.5 tablets (7.5 mg total) by mouth daily at bedtime 45 tablet 0    Prenatal Multivit-Min-Fe-FA (PRE-FRIDA PO) Take by mouth       No current facility-administered medications for this visit.      Allergies:     No Known Allergies   Physical Exam:     /72 (BP Location: Right arm, Patient Position: Sitting, Cuff Size: Large)   Pulse (!) 115   Temp (!) 96.9 °F (36.1 °C) (Tympanic)   Ht 5' 4\" (1.626 m)   Wt 95.3 kg (210 lb)   LMP 2025   SpO2 96%   BMI 36.05 kg/m²     Physical Exam  Vitals and nursing note reviewed.   Constitutional:       Appearance: She is well-developed. She is obese.   HENT:      Head: Normocephalic and atraumatic.      Right Ear: External ear normal.      Left Ear: External ear normal.      Nose: Nose normal.   Eyes:      Conjunctiva/sclera: Conjunctivae normal.      Pupils: Pupils are equal, round, and reactive to light.   Cardiovascular:      Rate and Rhythm: Normal rate and regular rhythm.      Heart sounds: Normal heart sounds.   Pulmonary:      Effort: Pulmonary effort is normal.      Breath sounds: Normal breath sounds.   Abdominal:      General: Bowel sounds are normal.      Palpations: Abdomen is soft.   Musculoskeletal:         General: Normal range of motion.      Cervical back: Normal range of motion and neck supple.   Skin:     General: Skin is warm and dry.   Neurological:      Mental Status: She is alert and oriented to person, place, and time. "   Psychiatric:         Behavior: Behavior normal.         Thought Content: Thought content normal.         Judgment: Judgment normal.          Millie Jacob DO  [facility]    Answers submitted by the patient for this visit:  Annual Physical (Submitted on 3/29/2025)  Diet/Nutrition choices: no special diet  Exercise choices: no formal exercise, walking  Sleep choices: sleeps well, 7-8 hours of sleep on average, snores loudly  Hearing choices: normal hearing bilateral ears  Vision choices: no vision problems  Dental choices: regular dental visits  Do you currently have an OB/GYN provider that you routinely follow with?: Yes  Menopausal status: premenopausal  Last menstrual cycle (if applicable):: 3/29/2025  Any history of sexual transmitted disease/infection?: No  Contraception: oral contraceptives  Do you have an advance directive/living will?: No  Do you have a durable power of  (POA)?: No

## 2025-04-01 NOTE — PATIENT INSTRUCTIONS
"Fasting blood work - [facility]  Physical therapy   Podiatry -   Call with date for referral   Patient Education     Plantar fasciitis   The Basics   Written by the doctors and editors at [facility]   What is plantar fasciitis? -- This is when a part of the foot called the \"plantar fascia\" gets irritated. The plantar fascia is a tough band of tissue that connects the heel bone to the toes (figure 1). Plantar fasciitis causes pain in the heel and bottom of the foot.  Plantar fasciitis is very common. It often affects people who run, jump, or stand for long periods. Most people get better within a year even without treatment.  What are the symptoms of plantar fasciitis? -- The most common symptom is pain under the heel and sole (bottom) of the foot.  The pain is often worst when you first get out of bed in the morning. It can also be bad when you get up after being seated for some time.  What can I do own my own to feel better? -- You can:   Rest - Rest your foot to help it heal. But don't completely stop being active. Doing that can lead to more pain and stiffness in the long run.   Ice your foot - Putting ice on your heel for 20 minutes up to 4 times a day might relieve pain. Put a thin towel between the ice and your skin. Icing and massaging your foot before exercise might also help.   Do special foot exercises - Certain exercises can help with heel pain (figure 2 and figure 3 and figure 4 and figure 5). Do these exercises every day.   Take pain medicines - If your pain is severe, you can try taking over-the-counter pain medicines. Examples include ibuprofen (sample brand names: Advil, Motrin) and naproxen (sample brand name: Aleve). But if you have other medical conditions or already take other medicines, ask your doctor or nurse before taking new pain medicines.   Wear sturdy shoes - Sneakers with a lot of cushion and good arch and heel support are best. Shoes with rigid soles can also help. Adding padded or gel heel " "inserts to your shoes might help, too.   Wear splints at night - Some people feel better if they wear a splint while they sleep that keeps their foot straight. These splints are sold in pharmacies and medical supply stores.  Is there a test for plantar fasciitis? -- No. But your doctor or nurse should be able to tell if you have it by learning about your symptoms and doing an exam. They might suggest an X-ray, or other tests to check whether your symptoms might be caused by something else.  How is plantar fasciitis treated? -- The first step is to try the things you can do on your own. If you do not get better, or your symptoms are severe, your doctor or nurse might suggest:   Taping up your foot in a special way that helps the support the foot (picture 1)   Special shoe inserts made to fit your foot   Shots (that go into your foot) of a medicine called a steroid, which can help with the pain   Putting a splint over your foot and ankle   Surgery (this is only an option for some people who do not get better with other treatments)  Some doctors also suggest a treatment called \"shock wave therapy.\" This treatment is painful and has not been proven to work.  How can I prevent getting heel pain again? -- To reduce the chances that your pain will come back:   Wear shoes that fit well, have a lot of cushion, and support your heel and ankle.   Do not wear slippers, flip-flops, slip-ons, or poorly fitted shoes.   Do not go barefoot.   Do not wear worn-out shoes.  All topics are updated as new evidence becomes available and our peer review process is complete.  This topic retrieved from [employer] on: Mar 31, 2024.  Topic 70617 Version 13.0  Release: 32.2.4 - C32.89  © 2024 UpToDate, Inc. and/or its affiliates. All rights reserved.  figure 1: Plantar fasciitis     The plantar fascia is a tough band of tissue that connects the heel bone to the toes.  Graphic 87306 Version 8.0  figure 2: Heel raises     Standon a step or book, " with your heels hanging off of the edge. Hold onto a counter,chair, or handrail to help with balance. Raise up onto your toes, and slowlylower your heel. Start with both feet at the same time. Then, when you can, do 1leg at a time. You can also place a towel under your toes. Repeat 10 to 15times, 1 to 3 times each day.  Graphic 240425 Version 1.0  figure 3: Seated calf stretch     Sitin a chair. Bend 1 leg, and rest the outside of your foot on your other knee.Grab your foot, and pull your toes and foot toward your knee until you feel astretch along the bottom of your foot. Repeat with your other foot. Repeat thisstretch 2 to 3 times, 1 to 3 times each day.  Graphic 905510 Version 1.0  figure 4: Ankle circles     Rest your leg on something so your calf is supported and your foot is in the air.  (A) Point and flex your foot a few times.  (B) Then, make a few circles with your foot by rotating your ankle.  Make a few ankle circles going in the other direction. Repeatthis stretch 2 to 3 times, 1 to 3 times each day.  Graphic 07844 Version 2.0  figure 5: Toe curls     Curl your toes around the edge of a book. Then, straighten them. Repeat over and over for 2 minutes, 2 times each day.  Graphic 44304 Version 3.0  picture 1: Foot taping for plantar fasciitis     Taping the foot like this sometimes helps with plantar fasciitis. You can use sports tape, available at pharmacies.  Step 1: Wrap a strip of tape around the foot, at the level of the ball of the foot.  Step 2: Wrap a second strip of tape around the heel, starting just below the pinky toe, around the sides of the heel, and back up to the first strip of tape.  Step 3: Wrap a third strip of tape around the heel, starting just below the pinky toe, like you did in step 2. This time, [place] the heel and wrap the tape in a crisscross, so that it ends just below the big toe.  Step 4: Repeat step 3. The tape does not need to align perfectly. The tape can stay in place for 1  "week.  Graphic 40256 Version 5.0  Consumer Information Use and Disclaimer   Disclaimer: This generalized information is a limited summary of diagnosis, treatment, and/or medication information. It is not meant to be comprehensive and should be used as a tool to help the user understand and/or assess potential diagnostic and treatment options. It does NOT include all information about conditions, treatments, medications, side effects, or risks that may apply to a specific patient. It is not intended to be medical advice or a substitute for the medical advice, diagnosis, or treatment of a health care provider based on the health care provider's examination and assessment of a patient's specific and unique circumstances. Patients must speak with a health care provider for complete information about their health, medical questions, and treatment options, including any risks or benefits regarding use of medications. This information does not endorse any treatments or medications as safe, effective, or approved for treating a specific patient. UpToDate, Inc. and its affiliates disclaim any warranty or liability relating to this information or the use thereof.The use of this information is governed by the Terms of Use, available at https://www.[employer]com/en/know/clinical-effectiveness-terms. 2024© UpToDate, Inc. and its affiliates and/or licensors. All rights reserved.  Copyright   © 2024 UpToDate, Inc. and/or its affiliates. All rights reserved.    Patient Education     Routine physical for adults   The Basics   Written by the doctors and editors at [facility]   What is a physical? -- A physical is a routine visit, or \"check-up,\" with your doctor. You might also hear it called a \"wellness visit\" or \"preventive visit.\"  During each visit, the doctor will:   Ask about your physical and mental health   Ask about your habits, behaviors, and lifestyle   Do an exam   Give you vaccines if needed   Talk to you about any " "medicines you take   Give advice about your health   Answer your questions  Getting regular check-ups is an important part of taking care of your health. It can help your doctor find and treat any problems you have. But it's also important for preventing health problems.  A routine physical is different from a \"sick visit.\" A sick visit is when you see a doctor because of a health concern or problem. Since physicals are scheduled ahead of time, you can think about what you want to ask the doctor.  How often should I get a physical? -- It depends on your age and health. In general, for people age 21 years and older:   If you are younger than 50 years, you might be able to get a physical every 3 years.   If you are 50 years or older, your doctor might recommend a physical every year.  If you have an ongoing health condition, like diabetes or high blood pressure, your doctor will probably want to see you more often.  What happens during a physical? -- In general, each visit will include:   Physical exam - The doctor or nurse will check your height, weight, heart rate, and blood pressure. They will also look at your eyes and ears. They will ask about how you are feeling and whether you have any symptoms that bother you.   Medicines - It's a good idea to bring a list of all the medicines you take to each doctor visit. Your doctor will talk to you about your medicines and answer any questions. Tell them if you are having any side effects that bother you. You should also tell them if you are having trouble paying for any of your medicines.   Habits and behaviors - This includes:   Your diet   Your exercise habits   Whether you smoke, drink alcohol, or use drugs   Whether you are sexually active   Whether you feel safe at home  Your doctor will talk to you about things you can do to improve your health and lower your risk of health problems. They will also offer help and support. For example, if you want to quit smoking, " "they can give you advice and might prescribe medicines. If you want to improve your diet or get more physical activity, they can help you with this, too.   Lab tests, if needed - The tests you get will depend on your age and situation. For example, your doctor might want to check your:   Cholesterol   Blood sugar   Iron level   Vaccines - The recommended vaccines will depend on your age, health, and what vaccines you already had. Vaccines are very important because they can prevent certain serious or deadly infections.   Discussion of screening - \"Screening\" means checking for diseases or other health problems before they cause symptoms. Your doctor can recommend screening based on your age, risk, and preferences. This might include tests to check for:   Cancer, such as breast, prostate, cervical, ovarian, colorectal, prostate, lung, or skin cancer   Sexually transmitted infections, such as chlamydia and gonorrhea   Mental health conditions like depression and anxiety  Your doctor will talk to you about the different types of screening tests. They can help you decide which screenings to have. They can also explain what the results might mean.   Answering questions - The physical is a good time to ask the doctor or nurse questions about your health. If needed, they can refer you to other doctors or specialists, too.  Adults older than 65 years often need other care, too. As you get older, your doctor will talk to you about:   How to prevent falling at home   Hearing or vision tests   Memory testing   How to take your medicines safely   Making sure that you have the help and support you need at home  All topics are updated as new evidence becomes available and our peer review process is complete.  This topic retrieved from [employer] on: May 02, 2024.  Topic 414724 Version 1.0  Release: 32.4.3 - C32.122  © 2024 UpToDate, Inc. and/or its affiliates. All rights reserved.  Consumer Information Use and Disclaimer "   Disclaimer: This generalized information is a limited summary of diagnosis, treatment, and/or medication information. It is not meant to be comprehensive and should be used as a tool to help the user understand and/or assess potential diagnostic and treatment options. It does NOT include all information about conditions, treatments, medications, side effects, or risks that may apply to a specific patient. It is not intended to be medical advice or a substitute for the medical advice, diagnosis, or treatment of a health care provider based on the health care provider's examination and assessment of a patient's specific and unique circumstances. Patients must speak with a health care provider for complete information about their health, medical questions, and treatment options, including any risks or benefits regarding use of medications. This information does not endorse any treatments or medications as safe, effective, or approved for treating a specific patient. UpToDate, Inc. and its affiliates disclaim any warranty or liability relating to this information or the use thereof.The use of this information is governed by the Terms of Use, available at https://www.[employer].com/en/know/clinical-effectiveness-terms. 2024© UpToDate, Inc. and its affiliates and/or licensors. All rights reserved.  Copyright   © 2024 UpToDate, Inc. and/or its affiliates. All rights reserved.

## 2025-04-06 PROBLEM — Z86.32 HISTORY OF GESTATIONAL DIABETES: Status: ACTIVE | Noted: 2025-04-06

## 2025-04-06 NOTE — ASSESSMENT & PLAN NOTE
Pt is transitioning psychiatrist , requesting renewal of medication until her transition to new psychiatrist

## 2025-04-06 NOTE — ASSESSMENT & PLAN NOTE
Discussed treatment and will consider physical therapy and podiatry evaluation  Since she has done stretches and shoe inserts

## 2025-04-23 DIAGNOSIS — F31.81 BIPOLAR 2 DISORDER, MAJOR DEPRESSIVE EPISODE (HCC): Chronic | ICD-10-CM

## 2025-04-24 RX ORDER — OLANZAPINE 5 MG/1
TABLET, FILM COATED ORAL
Qty: 45 TABLET | Refills: 0 | Status: SHIPPED | OUTPATIENT
Start: 2025-04-24

## 2025-05-19 ENCOUNTER — HOSPITAL ENCOUNTER (OUTPATIENT)
Dept: RADIOLOGY | Facility: HOSPITAL | Age: 30
Discharge: HOME | End: 2025-05-19
Attending: PODIATRIST
Payer: COMMERCIAL

## 2025-05-19 ENCOUNTER — OFFICE VISIT (OUTPATIENT)
Dept: PODIATRY | Facility: CLINIC | Age: 30
End: 2025-05-19
Attending: FAMILY MEDICINE

## 2025-05-19 VITALS — OXYGEN SATURATION: 99 % | HEIGHT: 64 IN | WEIGHT: 212 LBS | BODY MASS INDEX: 36.19 KG/M2 | HEART RATE: 80 BPM

## 2025-05-19 DIAGNOSIS — M79.671 PAIN IN BOTH FEET: ICD-10-CM

## 2025-05-19 DIAGNOSIS — M79.672 PAIN IN BOTH FEET: ICD-10-CM

## 2025-05-19 DIAGNOSIS — M79.671 PAIN IN BOTH FEET: Primary | ICD-10-CM

## 2025-05-19 DIAGNOSIS — M79.672 PAIN IN BOTH FEET: Primary | ICD-10-CM

## 2025-05-19 PROCEDURE — 73630 X-RAY EXAM OF FOOT: CPT

## 2025-05-19 NOTE — PROGRESS NOTES
Podiatry Clinic Visit  Kimberlyn Wills 29 y.o. female MRN: [id number]  Encounter: [id number]    Assessment & Plan        Diagnoses and all orders for this visit:    Pain in both feet  -     XR foot 3+ vw left; Future  -     XR foot 3+ vw right; Future    Other orders  -     Ambulatory Referral to Podiatry           Plan:  Patient was examined, evaluated, and treated with all questions and concerns addressed.  Patient's bilateral foot X-ray evaluated. Right foot shows rectus foot with normal bone alignment. Left foot shows sagittal plane flat foot with forefoot supinatus. These findings were related to the patient.  Patient's bilateral lower extremity biomechanics analyzed. Patient is showing flat foot deformity bilaterally with medial arch collapse. The deformity is flexible so asked the patient to purchase OTC orthosis/arch support for alignment.   Supportive sneakers recommended.  Patient is showing 1st MPJ clicking. X-ray shows narrowing of the 1st MPJ. Biomechanical analysis shows functional hallux limitus. The OTC orthosis/arch support should address this issue.  Patient shows bilateral equinus. Asked patient to perform stretching exercise. Instructions placed in chart.  Patient was re-appointed for 4-6 weeks     - Dr. Dewey was available/present for entirety of patient encounter and present for all procedures.    History of Present Illness     HPI: Kimberlyn Wills is a 29 y.o. female who presents with complaint of bilateral plantar arch pain. Patient has been having this pain since childhood but this is the first time to visit a podiatrist. She used soft insole gel padding, OTC tylenol or ibuprofen, but they did not help much. Patient wears [employer] sneakers. The symptom exacerbates in her pregnancy but did not alleviate after delivery which was winter of last year. The pain is not so bad the first few steps but progressively gets worse as weight bearing. The patient has no further podiatric complaints at this  "time.    Review of Systems   Constitutional: Negative.    HENT: Negative.    Eyes: Negative.    Respiratory: Negative.    Cardiovascular: Negative.    Gastrointestinal: Negative.    Musculoskeletal: bilateral arch pain  Skin: Negative  Neurological: Negative.        Historical Information   Past Medical History:   Diagnosis Date    Anxiety     on prozac    Asthma     no meds, cildood    Bipolar 2 disorder (HCC)     dx at age 27; goes to [facility].    Diabetes mellitus (HCC)     gestational on insulin    Gestational diabetes 2024    Gestational hypertension 11/04/2024    History of IUGR (intrauterine growth retardation) and stillbirth, currently pregnant, third trimester 05/18/2024    History of postpartum depression     Insulin controlled gestational diabetes mellitus (GDM) in third trimester 06/20/2024    Migraine     takes imitrex    Postpartum depression     no meds    Psychiatric disorder     depression and anxiety    Varicella     vaccinated     Past Surgical History:   Procedure Laterality Date    DENTAL IMPLANT  2024    ORTHOPEDIC SURGERY  2020    right knee    WISDOM TOOTH EXTRACTION       Social History   Social History     Substance and Sexual Activity   Alcohol Use Not Currently    Comment: pregnant     Social History     Substance and Sexual Activity   Drug Use Not Currently    Types: Marijuana    Comment: has medical marijuana use. has been using sporadically in pregnancy. will review with MFM per patient     Tobacco Use History[1]  Family History:   Family History   Problem Relation Age of Onset    Hypothyroidism Mother     Depression Mother     No Known Problems Father     Depression Sister     Depression Brother        Meds/Allergies   Not in a hospital admission.  Allergies[2]    Objective     Current Vitals:   Pulse: 80 (05/19/25 1123)  Height: 5' 4\" (162.6 cm) (05/19/25 1123)  Weight - Scale: 96.2 kg (212 lb) (05/19/25 1123)  SpO2: 99 % (05/19/25 1123)        Pulse 80   Ht 5' 4\" " (1.626 m)   Wt 96.2 kg (212 lb)   SpO2 99%   BMI 36.39 kg/m²       Lower Extremity Exam:    Foot Exam    Musculoskeletal:  MMT is 5/5 to all compartments of the LE B/L, +0/4 edema B/L,  Pain on  palpation of bilateral plantar arch, Equinus is present. No pain with passive ROM of pedal joints.     Vascular:   DP pulses are present bilaterally and PT pulses are present bilaterally., CFT< 3sec to all digits. Pedal hair is Present. Skin temperature warm to warm B/L.     Dermatological:  No open Lesions. Skin of the LE is normal texture, temperature, turgor B/L. Interdigital maceration is not present.  Nails are normal. Xerotic skin is not noted of the dorsum and plantar surface of the feet bilaterally.        Neurologic:  Gross sensation is Intact. Monofilament sensation is Intact. Sharp sensation is present.                       [1]   Social History  Tobacco Use   Smoking Status Every Day    Types: Cigarettes    Start date: 2012    Passive exposure: Current   Smokeless Tobacco Never   Tobacco Comments    Currently 2 cigarettes per day    Weaning- working on stopping completely and start nicotine patch   [2] No Known Allergies

## 2025-05-19 NOTE — PATIENT INSTRUCTIONS
Recommend quality over the counter arch supports:    - Powerstep Carmel series insoles; comes 3/4 length or full length  - Spenco Orthotic Arch insoles; comes 3/4 length or full length  - Superfeet insoles; comes full length only   - PCSsole- 3/4 length insoles

## 2025-05-22 DIAGNOSIS — F31.81 BIPOLAR 2 DISORDER, MAJOR DEPRESSIVE EPISODE (HCC): Chronic | ICD-10-CM

## 2025-05-23 RX ORDER — OLANZAPINE 5 MG/1
TABLET, FILM COATED ORAL
Qty: 45 TABLET | Refills: 0 | Status: SHIPPED | OUTPATIENT
Start: 2025-05-23

## 2025-06-16 ENCOUNTER — TELEPHONE (OUTPATIENT)
Dept: POSTPARTUM | Facility: CLINIC | Age: 30
End: 2025-06-16

## 2025-06-16 NOTE — TELEPHONE ENCOUNTER
Ajith is breastfeeding typically every 3 hours during the day, sometimes a little more frequently.  At night, he will sleep one 4 hour stretch and then feed every 3 hours the rest of the night.  He also recently started getting complimentary foods once or twice a day.    Peds expressed concerns with his weight recently.  Kimberlyn began pumping recently and is now concerned that the volumes she is able to express is fairly low (about 2 ounces) when she pumps.  Ajith will not accept the expressed milk from a bottle so she is using it to make his cereal.  I encouraged Kimberlyn to continue breastfeeding on demand.  I suggested a follow up appointment to re-evaluate a feeding to see if Ajith is transferring milk effectively at the breast.  I recommended high caloric density foods when offering complimentary foods.  An appointment was scheduled for a feeding evaluation and to evaluate whether or not Kimberlyn is pumping effectively.  I encouraged Kimberlyn to call with any questions or concerns.

## 2025-06-16 NOTE — TELEPHONE ENCOUNTER
Kimberlyn called - she is exclusively breastfeeding Beau (7m) - at last Ped visit he is not gaining as well as they would like & Ped recommended mom pump to see how much milk she gets.  She pumped & got total of 2oz from both breasts.  Beau will not take bottle either (mom has tried several different).  She is using her pumped milk in cereal for Beau.She is asking for tips on increasing her supply, she would prefer not to give formula if at all possible.

## 2025-06-18 DIAGNOSIS — F31.81 BIPOLAR 2 DISORDER, MAJOR DEPRESSIVE EPISODE (HCC): Chronic | ICD-10-CM

## 2025-06-18 RX ORDER — OLANZAPINE 5 MG/1
TABLET, FILM COATED ORAL
Qty: 45 TABLET | Refills: 0 | Status: SHIPPED | OUTPATIENT
Start: 2025-06-18

## 2025-06-25 ENCOUNTER — OFFICE VISIT (OUTPATIENT)
Dept: POSTPARTUM | Facility: CLINIC | Age: 30
End: 2025-06-25
Payer: COMMERCIAL

## 2025-06-25 PROCEDURE — 99404 PREV MED CNSL INDIV APPRX 60: CPT | Performed by: PEDIATRICS

## 2025-06-25 RX ORDER — NORETHINDRONE ACETATE AND ETHINYL ESTRADIOL 1MG-20(21)
1 KIT ORAL DAILY
Qty: 84 TABLET | Refills: 1 | Status: SHIPPED | OUTPATIENT
Start: 2025-06-25 | End: 2026-05-27

## 2025-06-25 NOTE — PROGRESS NOTES
"BREAST FEEDING FOLLOW UP VISIT    Informant/Relationship: Kimberlyn (mom/self) and Chasidy (big sister)     Discussion of General Lactation Issues: Kimberlyn is here for support with pumping. She is not able to pump milk more than 2 oz per pumping sessions.     There were some weight concerns, but this resolved with continuing to nurse on demand and staring some complimentary solids.     Infant is 7 mo old today.    Interval Breastfeeding History:    Frequency of breast feeding: every 3 hours, some \"snacks\" prior to nursing, one 4 hours stretch   Does mother feel breastfeeding is effective: Yes  Does infant appear satisfied after nursing:Yes  Stooling pattern normal:Yes  Urinating frequently:Yes  Using shield or shells:No    Alternative/Artificial Feedings:   Bottle: just was able to get him to take a bottle, trailed Eveflo, Lansinoh, Nuk   Cup: No  Syringe/Finger: No           Formula Type: Kandamil (had allergic reaction to this) Alimentum                      Amount: took 1 oz at a time                Frequency Q no regular bottles   Elimination Problems: No      Equipment:    Pump            Type: Spectra S2 , flange 24 mm             Frequency of Use: just to get it into a bottle       Equipment Problems: yes not sure if pumping is effective     Observed pumping session with Spectra S2 and 24 mm flange. Mom's nipple measured 13 mm today. Immediate areola tunneling observed. Milk does spray during DEBRA, but does not collect past this. Triggered another 2 letdowns with massage mode. No pain reported with pumping. Reviewed settings and lowest effective pumping.     Mom:  Breast: Normal  Nipple Assessment in General: Normal: elongated/eraser, no discoloration and no damage noted.  Mother's Awareness of Feeding Cues                 Recognizes: Yes                  Verbalizes: Yes  Support System: good support   History of Breastfeeding:  Latching pain, nipple damage throughout the breastfeeding journey. She was having " bleeding, and scarring. She nursed her for over 1 year.   Changes/Stressors/Violence: Concerns with weight gain, but this resolved. Mom is worried about her pumping output and effectiveness   Concerns/Goals: Kimberlyn would like to continue breastfeeding/providing breast milk for at least 1 year.     Problems with Mom: none, concerns with pumping output     Physical Exam  Constitutional:       Appearance: Normal appearance.   HENT:      Head: Normocephalic.   Pulmonary:      Effort: Pulmonary effort is normal.     Musculoskeletal:      Cervical back: Normal range of motion.     Neurological:      General: No focal deficit present.      Mental Status: She is alert and oriented to person, place, and time.     Skin:     General: Skin is warm.      Capillary Refill: Capillary refill takes less than 2 seconds.     Psychiatric:         Mood and Affect: Mood normal.         Behavior: Behavior normal.         Thought Content: Thought content normal.         Judgment: Judgment normal.         Infant:  Behaviors: Alert  Color: Pink  Birth weight: 2710 g   Current weight: 7518 from Ped visit 6/24    Problems with infant: baby in PT, hearing support through early intervention, infectious disease, neurology. Hx. Congenital CMV       General Appearance:  Alert, active, no distress                             Head:  In helmet to plagiocephaly , AFOF, sutures opposed                             Eyes:  Conjunctiva clear, no drainage                              Ears:  Normally placed, no anomolies                             Nose:  Septum intact, no drainage or erythema                           Mouth:  No lesions. Observed tongue extend past his lip, and is at his palate at rest. Not willing to suck on my finger today.                     Neck:  Supple, symmetrical, trachea midline                 Respiratory:  No grunting, flaring, retractions, breath sounds clear and equal            Cardiovascular:  Regular rate and rhythm. No  "murmur. Adequate perfusion/capillary refill. Femoral pulse present                    Abdomen:   Soft, non-tender, no masses, bowel sounds present, no HSM             Genitourinary:  Normal male, testes descended, no discharge, swelling, or pain, anus patent                          Spine:   No abnormalities noted        Musculoskeletal:  Full range of motion          Skin/Hair/Nails:   Skin warm, dry, and intact, no rashes or abnormal dyspigmentation or lesions                Neurologic:   No abnormal movement, tone appropriate for gestational age    [place] Latch on Volufeed bottle :  Efficiency:               Lips Flanged: no              Depth of latch: narrow, chomping               Audible Swallow: Yes                      Suck Swallow Cycle: Breathing: yes, Coordinated: chomping motion  Latch Problems: not latching well to the bottle     Position:  Infant's Ergonomics/Body               Body Alignment: Yes               Head Supported: Yes               Close to Mom's body/ Lifted/ Supported: Yes               Mom's Ergonomics/Body: Yes                           Supported: Yes                           Sitting Back: Yes                           Brings Baby to her breast: Yes        Education:  Reviewed Latch: wide attachment, signs of active drinking   Reviewed Frequency/Supply & Demand: pump to replace missed breastfeeding sessions, tips for effective pumping   Reviewed Mom/Breast care: gentle handling of the breast at all times   Reviewed Equipment: nipple diameter assessed, flange size recommended. Reviewed/demonstrated \"cycle pumping\"      Plan:  Continue offering Beau the breast on demand when you're with him - he is growing well. Pump when not with him for feedings. Obtain a 13-14 mm flange to use with Spectra pump. Cycle pump sessions as demonstrated to mimic baby nursing. Offer Beau bottles or cups for additional volumes if not with Mom for feedings, continue with complimentary solids. Follow up as " needed for ongoing feeding and pumping support.     I have spent 60 minutes with Patient and family today in which greater than 50% of this time was spent in counseling/coordination of care regarding Patient and family education.

## 2025-06-25 NOTE — PATIENT INSTRUCTIONS
"-Continue offering Beau the breast when you're with him, pump to replace missed breastfeeding sessions when you're not.  -Your nipples measured at 13 mm on the right and 13 mm on the left. I recommend trying these size flanges for pumping. Goal with \"correct\" flange fit is that there is no pain with pumping, there is little to no areola tunneling into the flange, and milk is spraying from the nipple when pumping.    --Cycle pumping : high speed, low suction until you see your milk flow, then switch to a lower speed and higher suction to express the milk efficiently. Continue to gradually increase suction and decrease speed throughout the session. You may cycle back into \"massage mode\" to stimulate another letdown when you see your milk flow slow within the pumping session or at the end of the pumping session.   -Try to be as relaxed as possible while pumping, take some deep breaths, feeling yourself relax from head to toe, watch something funny on your phone or TV, cover flanges, etc.    -Wear a supportive, but not tight, bra. Sit comfortably reclined when nursing or pumping, and throughout the day when possible.    -Consider discussing other birth control options with provider, it is not recommend to be on a pill containing estrogen while breastfeeding (due to risk of decreasing production)   -Follow up as needed for ongoing feeding and pumping support   "

## 2025-06-30 NOTE — PROGRESS NOTES
I have reviewed the notes, assessments, and/or procedures performed by Aria Wilburn RN, IBCLC, I concur with her/his documentation of Kimberlyn Monk MD 06/29/25

## 2025-07-21 DIAGNOSIS — F31.81 BIPOLAR 2 DISORDER, MAJOR DEPRESSIVE EPISODE (HCC): Chronic | ICD-10-CM

## 2025-07-22 RX ORDER — OLANZAPINE 5 MG/1
TABLET, FILM COATED ORAL
Qty: 45 TABLET | Refills: 0 | Status: SHIPPED | OUTPATIENT
Start: 2025-07-22

## 2025-07-29 DIAGNOSIS — F31.81 BIPOLAR 2 DISORDER, MAJOR DEPRESSIVE EPISODE (HCC): Chronic | ICD-10-CM

## 2025-07-29 RX ORDER — OLANZAPINE 5 MG/1
7.5 TABLET, FILM COATED ORAL
Qty: 45 TABLET | Refills: 0 | Status: SHIPPED | OUTPATIENT
Start: 2025-07-29